# Patient Record
Sex: FEMALE | Race: WHITE | NOT HISPANIC OR LATINO | Employment: STUDENT | URBAN - METROPOLITAN AREA
[De-identification: names, ages, dates, MRNs, and addresses within clinical notes are randomized per-mention and may not be internally consistent; named-entity substitution may affect disease eponyms.]

---
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
## 2020-02-07 ENCOUNTER — LAB VISIT (OUTPATIENT)
Dept: LAB | Facility: HOSPITAL | Age: 20
End: 2020-02-07
Attending: PSYCHIATRY & NEUROLOGY
Payer: COMMERCIAL

## 2020-02-07 ENCOUNTER — OFFICE VISIT (OUTPATIENT)
Dept: PEDIATRIC NEUROLOGY | Facility: CLINIC | Age: 20
End: 2020-02-07
Payer: COMMERCIAL

## 2020-02-07 VITALS
WEIGHT: 148.13 LBS | HEIGHT: 66 IN | HEART RATE: 112 BPM | SYSTOLIC BLOOD PRESSURE: 131 MMHG | DIASTOLIC BLOOD PRESSURE: 72 MMHG | BODY MASS INDEX: 23.81 KG/M2

## 2020-02-07 DIAGNOSIS — G40.319 GENERALIZED EPILEPSY, INTRACTABLE: Primary | ICD-10-CM

## 2020-02-07 DIAGNOSIS — G40.009 PARTIAL IDIOPATHIC EPILEPSY: ICD-10-CM

## 2020-02-07 DIAGNOSIS — G40.209 PARTIAL EPILEPSY WITH IMPAIRMENT OF CONSCIOUSNESS: ICD-10-CM

## 2020-02-07 DIAGNOSIS — Z96.89 S/P PLACEMENT OF VNS (VAGUS NERVE STIMULATION) DEVICE: ICD-10-CM

## 2020-02-07 DIAGNOSIS — G40.319 GENERALIZED EPILEPSY, INTRACTABLE: ICD-10-CM

## 2020-02-07 LAB
ALBUMIN SERPL BCP-MCNC: 4.9 G/DL (ref 3.5–5.2)
ALP SERPL-CCNC: 106 U/L (ref 55–135)
ALT SERPL W/O P-5'-P-CCNC: 22 U/L (ref 10–44)
ANION GAP SERPL CALC-SCNC: 13 MMOL/L (ref 8–16)
AST SERPL-CCNC: 16 U/L (ref 10–40)
BASOPHILS # BLD AUTO: 0.04 K/UL (ref 0–0.2)
BASOPHILS NFR BLD: 0.4 % (ref 0–1.9)
BILIRUB SERPL-MCNC: 0.4 MG/DL (ref 0.1–1)
BUN SERPL-MCNC: 8 MG/DL (ref 6–20)
CALCIUM SERPL-MCNC: 10.2 MG/DL (ref 8.7–10.5)
CHLORIDE SERPL-SCNC: 106 MMOL/L (ref 95–110)
CO2 SERPL-SCNC: 24 MMOL/L (ref 23–29)
CREAT SERPL-MCNC: 0.9 MG/DL (ref 0.5–1.4)
DIFFERENTIAL METHOD: NORMAL
EOSINOPHIL # BLD AUTO: 0.2 K/UL (ref 0–0.5)
EOSINOPHIL NFR BLD: 1.5 % (ref 0–8)
ERYTHROCYTE [DISTWIDTH] IN BLOOD BY AUTOMATED COUNT: 12.1 % (ref 11.5–14.5)
EST. GFR  (AFRICAN AMERICAN): >60 ML/MIN/1.73 M^2
EST. GFR  (NON AFRICAN AMERICAN): >60 ML/MIN/1.73 M^2
GLUCOSE SERPL-MCNC: 92 MG/DL (ref 70–110)
HCT VFR BLD AUTO: 45 % (ref 37–48.5)
HGB BLD-MCNC: 15.7 G/DL (ref 12–16)
LYMPHOCYTES # BLD AUTO: 2.7 K/UL (ref 1–4.8)
LYMPHOCYTES NFR BLD: 26 % (ref 18–48)
MCH RBC QN AUTO: 29.3 PG (ref 27–31)
MCHC RBC AUTO-ENTMCNC: 34.9 G/DL (ref 32–36)
MCV RBC AUTO: 84 FL (ref 82–98)
MONOCYTES # BLD AUTO: 0.7 K/UL (ref 0.3–1)
MONOCYTES NFR BLD: 7 % (ref 4–15)
NEUTROPHILS # BLD AUTO: 6.7 K/UL (ref 1.8–7.7)
NEUTROPHILS NFR BLD: 65.1 % (ref 38–73)
PLATELET # BLD AUTO: 349 K/UL (ref 150–350)
PMV BLD AUTO: 9.3 FL (ref 9.2–12.9)
POTASSIUM SERPL-SCNC: 4.4 MMOL/L (ref 3.5–5.1)
PROT SERPL-MCNC: 7.9 G/DL (ref 6–8.4)
RBC # BLD AUTO: 5.36 M/UL (ref 4–5.4)
SODIUM SERPL-SCNC: 143 MMOL/L (ref 136–145)
TSH SERPL DL<=0.005 MIU/L-ACNC: 2.53 UIU/ML (ref 0.4–4)
WBC # BLD AUTO: 10.2 K/UL (ref 3.9–12.7)

## 2020-02-07 PROCEDURE — 36415 COLL VENOUS BLD VENIPUNCTURE: CPT

## 2020-02-07 PROCEDURE — 80053 COMPREHEN METABOLIC PANEL: CPT

## 2020-02-07 PROCEDURE — 95977 ALYS CPLX CN NPGT PRGRMG: CPT | Mod: S$GLB,,, | Performed by: PSYCHIATRY & NEUROLOGY

## 2020-02-07 PROCEDURE — 95977 PR ELEC ANALYSIS, IMPLT NEURO PULSE GEN, W/PRGRM, CMPLX CRAN NERVE: ICD-10-PCS | Mod: S$GLB,,, | Performed by: PSYCHIATRY & NEUROLOGY

## 2020-02-07 PROCEDURE — 80177 DRUG SCRN QUAN LEVETIRACETAM: CPT

## 2020-02-07 PROCEDURE — 3008F PR BODY MASS INDEX (BMI) DOCUMENTED: ICD-10-PCS | Mod: CPTII,S$GLB,, | Performed by: PSYCHIATRY & NEUROLOGY

## 2020-02-07 PROCEDURE — 99215 OFFICE O/P EST HI 40 MIN: CPT | Mod: 25,S$GLB,, | Performed by: PSYCHIATRY & NEUROLOGY

## 2020-02-07 PROCEDURE — 80175 DRUG SCREEN QUAN LAMOTRIGINE: CPT

## 2020-02-07 PROCEDURE — 99215 PR OFFICE/OUTPT VISIT, EST, LEVL V, 40-54 MIN: ICD-10-PCS | Mod: 25,S$GLB,, | Performed by: PSYCHIATRY & NEUROLOGY

## 2020-02-07 PROCEDURE — 99999 PR PBB SHADOW E&M-EST. PATIENT-LVL III: CPT | Mod: PBBFAC,,, | Performed by: PSYCHIATRY & NEUROLOGY

## 2020-02-07 PROCEDURE — 85025 COMPLETE CBC W/AUTO DIFF WBC: CPT

## 2020-02-07 PROCEDURE — 99999 PR PBB SHADOW E&M-EST. PATIENT-LVL III: ICD-10-PCS | Mod: PBBFAC,,, | Performed by: PSYCHIATRY & NEUROLOGY

## 2020-02-07 PROCEDURE — 84443 ASSAY THYROID STIM HORMONE: CPT

## 2020-02-07 PROCEDURE — 3008F BODY MASS INDEX DOCD: CPT | Mod: CPTII,S$GLB,, | Performed by: PSYCHIATRY & NEUROLOGY

## 2020-02-07 RX ORDER — LEVETIRACETAM 1000 MG/1
1500 TABLET ORAL 2 TIMES DAILY
Qty: 90 TABLET | Refills: 4 | Status: SHIPPED | OUTPATIENT
Start: 2020-02-07 | End: 2020-07-09

## 2020-02-07 RX ORDER — LAMOTRIGINE 200 MG/1
200 TABLET ORAL 2 TIMES DAILY
Qty: 60 TABLET | Refills: 6 | Status: SHIPPED | OUTPATIENT
Start: 2020-02-07 | End: 2020-08-27 | Stop reason: SDUPTHER

## 2020-02-07 NOTE — PROGRESS NOTES
Subjective:      Patient ID: Diana Trivedi is a 19 y.o. female with focal intractable generalized epilepsy     HPI     She was last seen on 6/10/19.    She is well. No GTC seizures since 2017. She still has eye fluttering a few times a day but is conscious during the episodes, they do not cause functional impairment. She is practicing driving with her step dad and does not have these spells She is compliant with her meds, no missed doses. She is taking Lamictal 200 mg BID and Keppra 1500mg BID. She feels tired but denies fainting, dizziness, syncope. She has occasional headache but denies blurry vision, nausea, vomiting. No fever, weight loss, night sweats. Denies low mood and crying spells        Failed meds- zonegran    VNS  Output Current mA 1 to 1.25 to 1.5  Signal Freq Hz 30   Pulse width uSec 250  Signal on time Sec 30  Signal off time Min 5.0  Mag Current mA 1.25 to 1.5 to 1.75  Mag on time Sec 60  Pulse width uSec 500  Near EOS No   autostim 1.125 to 1.375 to 1.625  sens 30%        MRI 12/27/13 normal  ASHLEY nml  LTM 3/2014- multiple focal seizures right ant parasaggital also right posterior quadrant     2 Hour EEG 2/16/18- normal    Labs 6/10/19-  lamictal level 7.6  CMP ok  CBC ok  TSH OK       Received long term EMU results off medications from [facility].  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 Taking online college courses.      The following portions of the patient's history were reviewed and updated as appropriate: allergies, current medications, past family history, past medical history, past social history, past surgical history and problem list.     Review of Systems   Constitutional: Positive for fatigue,  HENT: Negative.    Neurological: Positive for seizures. Negative for dizziness, tremors, syncope, facial asymmetry, weakness.   Hematological: Negative.    Psychiatric/Behavioral: Negative for agitation,  self-injury and sleep disturbance. The patient is not nervous/anxious.    All other systems reviewed and are negative.        Objective:   Neurologic Exam      Mental Status   Oriented to person, place, and time.      Cranial Nerves      CN III, IV, VI   Pupils are equal, round, and reactive to light.  Extraocular motions are normal.      Motor Exam      Strength   Strength 5/5 throughout.         Physical Exam   Constitutional: She is oriented to person, place, and time. She appears well-developed.   HENT:   Head: Normocephalic.   Eyes: EOM are normal. Pupils are equal, round, and reactive to light.   Neck: Normal range of motion.   Cardiovascular: Normal rate and regular rhythm.   Pulmonary/Chest: Effort normal and breath sounds normal.   Abdominal: Soft.   Musculoskeletal: Normal range of motion.   Neurological: She is alert and oriented to person, place, and time. She has normal strength and normal reflexes. No cranial nerve deficit or sensory deficit. She exhibits normal muscle tone. Coordination and gait normal.   Fundoscopic exam normal with no papilledema     Skin: Skin is warm. No rash noted.           Assessment:   20 yo with intractable generalized epilepsy well controlled on current dose of lamictal and keppra.      Plan:     -Continue Lamictal  200 mg BID and Keppra 1500 mg BID  - Family did not want to get EMU because they are sure that eye rolling episodes are seizures  Labs and levels reviewed  Seizure precautions and seizure first aid were discussed with the patient and family.  Discussed transition to adult epilepsy  VNS adjusted as above  Family was instructed to contact either the primary care physician office or our office by telephone if there is any deterioration in his neurologic status, change in presenting symptoms, lack of beneficial response to treatment plan, or signs of adverse effects of current therapies, all of which were reviewed.

## 2020-02-10 LAB
LAMOTRIGINE SERPL-MCNC: 9.9 UG/ML (ref 2–15)
LEVETIRACETAM SERPL-MCNC: 52.6 UG/ML (ref 3–60)

## 2020-08-05 ENCOUNTER — TELEPHONE (OUTPATIENT)
Dept: PEDIATRIC NEUROLOGY | Facility: CLINIC | Age: 20
End: 2020-08-05

## 2020-08-05 ENCOUNTER — TELEPHONE (OUTPATIENT)
Dept: NEUROLOGY | Facility: CLINIC | Age: 20
End: 2020-08-05

## 2020-08-05 NOTE — TELEPHONE ENCOUNTER
Contacted patient/parent. Advised that MD discussed transition to adult neurology at last appt and is encouraging patient to establish care with adult neurology. Parent/patient verbalized understanding.     ----- Message from Kimmie Handy MD sent at 8/5/2020 10:23 AM CDT -----  Contact: Pt [phone]--  We discussed transition at last appt. Please encourage adult.  If they insist, can see one more time.  Will need to be in person because VNS  ----- Message -----  From: Hannah Baldwin  Sent: 8/4/2020  11:37 AM CDT  To: Rozina Burks Staff    Needs Advice    Reason for call:--Appointment--        Communication Preference:--Diana--[phone]--    Additional Information:Pt calling to schedule an f/u appointment with Dr Handy. I tried to schedule but would not allow me to due to she an adult pt. Please call to scheduling.

## 2020-08-05 NOTE — TELEPHONE ENCOUNTER
----- Message from Whit Ross sent at 8/5/2020  2:54 PM CDT -----  Regarding: appointment  Contact: Todd (mother) @ [phone]  Calling to schedule an appt with a Neurologist for seizures, asking to be seen in August. Please call.

## 2020-08-05 NOTE — TELEPHONE ENCOUNTER
Returned call to parent, spoke to mother. Advised per MD patient needs to transition to adult neurology. Mother verbalized understanding.     ----- Message from Radha Cabrera sent at 8/5/2020  2:57 PM CDT -----  Contact: Mom [phone]  Would like to receive medical advice.    Would they like a call back or a response via [facility]:  Call back     Additional information:  Calling to speak to the nurse regarding the pt being seen. Mom states the pt was told she would have to see an adult provider.

## 2020-08-05 NOTE — TELEPHONE ENCOUNTER
Left message offering appointment with Dr Bender at [facility] [place] location on August 27

## 2020-08-27 ENCOUNTER — OFFICE VISIT (OUTPATIENT)
Dept: NEUROLOGY | Facility: CLINIC | Age: 20
End: 2020-08-27
Payer: COMMERCIAL

## 2020-08-27 ENCOUNTER — LAB VISIT (OUTPATIENT)
Dept: LAB | Facility: OTHER | Age: 20
End: 2020-08-27
Attending: PSYCHIATRY & NEUROLOGY
Payer: COMMERCIAL

## 2020-08-27 VITALS
BODY MASS INDEX: 26.12 KG/M2 | SYSTOLIC BLOOD PRESSURE: 111 MMHG | HEIGHT: 66 IN | HEART RATE: 76 BPM | DIASTOLIC BLOOD PRESSURE: 72 MMHG | WEIGHT: 162.5 LBS

## 2020-08-27 DIAGNOSIS — G40.009 PARTIAL IDIOPATHIC EPILEPSY: ICD-10-CM

## 2020-08-27 DIAGNOSIS — G40.909 NONINTRACTABLE EPILEPSY WITHOUT STATUS EPILEPTICUS, UNSPECIFIED EPILEPSY TYPE: Primary | ICD-10-CM

## 2020-08-27 DIAGNOSIS — Z96.89 S/P PLACEMENT OF VNS (VAGUS NERVE STIMULATION) DEVICE: ICD-10-CM

## 2020-08-27 PROBLEM — G40.319 GENERALIZED EPILEPSY, INTRACTABLE: Status: RESOLVED | Noted: 2018-05-17 | Resolved: 2020-08-27

## 2020-08-27 PROCEDURE — 3008F PR BODY MASS INDEX (BMI) DOCUMENTED: ICD-10-PCS | Mod: CPTII,S$GLB,, | Performed by: PSYCHIATRY & NEUROLOGY

## 2020-08-27 PROCEDURE — 3008F BODY MASS INDEX DOCD: CPT | Mod: CPTII,S$GLB,, | Performed by: PSYCHIATRY & NEUROLOGY

## 2020-08-27 PROCEDURE — 36415 COLL VENOUS BLD VENIPUNCTURE: CPT

## 2020-08-27 PROCEDURE — 99205 OFFICE O/P NEW HI 60 MIN: CPT | Mod: S$GLB,,, | Performed by: PSYCHIATRY & NEUROLOGY

## 2020-08-27 PROCEDURE — 99205 PR OFFICE/OUTPT VISIT, NEW, LEVL V, 60-74 MIN: ICD-10-PCS | Mod: S$GLB,,, | Performed by: PSYCHIATRY & NEUROLOGY

## 2020-08-27 PROCEDURE — 80175 DRUG SCREEN QUAN LAMOTRIGINE: CPT

## 2020-08-27 PROCEDURE — 80177 DRUG SCRN QUAN LEVETIRACETAM: CPT

## 2020-08-27 PROCEDURE — 99999 PR PBB SHADOW E&M-EST. PATIENT-LVL III: CPT | Mod: PBBFAC,,, | Performed by: PSYCHIATRY & NEUROLOGY

## 2020-08-27 PROCEDURE — 99999 PR PBB SHADOW E&M-EST. PATIENT-LVL III: ICD-10-PCS | Mod: PBBFAC,,, | Performed by: PSYCHIATRY & NEUROLOGY

## 2020-08-27 PROCEDURE — 95976 PR ELEC ANALYSIS, IMPLT NEURO PULSE GEN, W/PRGRM, SMPL CRAN NERVE: ICD-10-PCS | Mod: S$GLB,,, | Performed by: PSYCHIATRY & NEUROLOGY

## 2020-08-27 PROCEDURE — 95976 ALYS SMPL CN NPGT PRGRMG: CPT | Mod: S$GLB,,, | Performed by: PSYCHIATRY & NEUROLOGY

## 2020-08-27 RX ORDER — LEVETIRACETAM 1000 MG/1
1500 TABLET ORAL 2 TIMES DAILY
Qty: 270 TABLET | Refills: 3 | Status: SHIPPED | OUTPATIENT
Start: 2020-08-27 | End: 2021-08-11 | Stop reason: SDUPTHER

## 2020-08-27 RX ORDER — LAMOTRIGINE 200 MG/1
200 TABLET ORAL 2 TIMES DAILY
Qty: 180 TABLET | Refills: 3 | Status: SHIPPED | OUTPATIENT
Start: 2020-08-27 | End: 2021-07-26

## 2020-08-27 RX ORDER — FOLIC ACID 1 MG/1
1 TABLET ORAL DAILY
Qty: 90 TABLET | Refills: 3 | Status: SHIPPED | OUTPATIENT
Start: 2020-08-27 | End: 2021-08-11 | Stop reason: SDUPTHER

## 2020-08-27 NOTE — LETTER
August 27, 2020      Kimmie Handy MD  [address]  [facility] [address]           [facility] [address]  [place] [address]  Phone: [phone]  Fax: [phone]          Patient: Diana Trivedi   MR Number: [id number]   YOB: 2000   Date of Visit: 8/27/2020       Dear Dr. Kimmie Handy:    Thank you for referring Diana Trivedi to me for evaluation. Attached you will find relevant portions of my assessment and plan of care.    If you have questions, please do not hesitate to call me. I look forward to following Diana Trivedi along with you.    Sincerely,    Bettye Bender MD PhD    Enclosure  CC:  No Recipients    If you would like to receive this communication electronically, please contact [email or web address] or [phone] to request more information on [employer] access.    For providers and/or their staff who would like to refer a patient to [facility], please contact us through our one-stop-shop provider referral line, [facility], at [phone].    If you feel you have received this communication in error or would no longer like to receive these types of communications, please e-mail [email or web address]

## 2020-08-27 NOTE — PATIENT INSTRUCTIONS
You came to neurology clinic because of your seizure disorder. You have not had any seizures since 2017. Let's keep your medications the same. Keppra 1500mg twice daily and Lamictal 200mg twice daily. Please go by the lab and get the levels drawn on your way out.  Please start folic acid 1 mg daily.    Go to the AES (american epilepsy society) website for many patient resources for people with epilepsy.      Confirm with your dermatologist that he does not think your rash could be a drug rash. If this is possible, we can try stopping the lamotrigine to see if that would help.  Come back to clinic if that is something you decide you would like to try. If you would like to use something to avoid pregnancy, consider an IUD or progesterone only formulation.     Please try to get regular exercise. Try 3-4 days a week, for 30 minutes at a time.     Per [place] law, no episodes of loss of consciousness for 6 months before you may drive.  Please avoid dangerous situations.  For example, no baths/pools by yourself, no heights, no power tools.  Please wear a bike helmet.  If you have a single breakthrough seizure, please call the office and we will decide the next steps. If you have multiple seizures in a row and do not return back to baseline, 911 needs to be called.     Return to clinic in a year, sooner with any issues. Also, please patient portal with any concerns.     Bettye Bender MD PhD  Neurology-Epilepsy  [facility].  [facility]

## 2020-08-27 NOTE — PROGRESS NOTES
Name: Diana Trivedi  MRN:[id number]   CSN: [id number]  Date of service: 8/27/2020  Age:20 y.o.   Gender:female   Referring Physician/Service: Kimmie Handy MD  [address] [place] [address]   The patient is here today with:  mom    Neurology Clinic: Initial Visit    CHIEF COMPLAINT:      HPI 8/27/2020:     Age of first seizure: 9yo   Seizure Risk Factors: maternal 2nd cousin once removed and 3rd cousin once removed with seizures as a kid but controlled now, No head strike with LOC, no CNS infection, term (2weeks over due) no prolonged hospitalization    Time of Last Seizure: 2017 EMU stay with 3 seizures in 1 day, ripped toenail off. Head strike.   # of lifetime Seizures:  10 big stiffening episodes, hand rubbing face maybe more than 500  Frequency of Seizures: None in three years, before that every 5 minutes with the rubbing events   Seizure Triggers: missed medication, stress, poor sleep, seizures usually in the morning.   Injuries/Hospitalization for seizures?  Falls, bloody nose, head strike, ripped off toenail. 3 hospitalizations. 2 EMU stays.   Pregnancy? Maybe one day   Contraception? None, just quit because of weight gain   Folic Acid? Not currently on   Bone Health: no dexa     Auras: stomach will twirl, fingers curl, seconds before the seizure     Seizure Events:   1. Roaming around, fusses, eyes big, hands go up towards chest, whole body stiffening, chewing, makes an ictal grunt, no urinary incontinence, tongue bite   2. Eyes rolling up, will not hear anything, stuttering, left hand rubbing face (stared in pre-K, not since starting keppra)    Current AED/SEs:  1. Keppra 1500mg twice daily SE: none   2. Lamotrigine 200mg BID SE: none     Previous AED/SEs or reason for DC.   1.  Zonisamide SE didn't work     VNS placed 7/2018  Output Current mA 1.5  Signal Freq Hz 20   Pulse width uSec 250  Signal on time Sec 30  Signal off time Min 5.0  Mag Current mA 1.75  Mag on time Sec  "60  Pulse width uSec 500  Near EOS No   autostim 1.625  sens 30%   Battery %    EEG:   Eeg 01/2014 (result in media tab)- posterior dominant sharply contoured bursts noted intermittently, 1 to several seconds long epileptiform bursts of polyspike activity maximal over frontal head regions both generalized and more fragmented with shifting maxima, these findings are most consistent with a tendency to primary generalized seizures.  The presence of fragmented shifting bursts raises the question of a lateralize deep frontal focus of secondary generalization.  LTM 3/2014- multiple focal seizures right ant parasaggital also right posterior quadrant  2 Hour EEG 2/16/18- normal  Received long term EMU results off medications from [facility].  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 Migrating headaches. 2HA/per month, do not require treatment. Constipation. Otherwise, denies frequent eye blinking, loss of vision, blurred vision, diplopia, dysarthria, dysphagia, lightheadedness, vertigo, tinnitus or hearing difficulty. Denies difficulties producing or comprehending speech.  Denies focal weakness, numbness, paresthesias. Denies difficulty with gait. Denies cough, shortness of breath.  Denies chest pain or tightness, palpitations.  Denies nausea, vomiting, diarrhea, or abdominal pain.  No bowel or bladder incontinence or retention.  No saddle anesthesia.  No falls.       EXAM:   - Vitals: /72   Pulse 76   Ht 5' 5.75" (1.67 m)   Wt 73.7 kg (162 lb 7.7 oz)   BMI 26.42 kg/m²    - General: Awake, cooperative, NAD.  - HEENT: NC/AT  - Neck: Supple  - Pulmonary: no increased WOB  - Cardiac: well perfused "   - Abdomen: soft, nontender, nondistended  - Extremities: no edema  - Skin:  Small papules over the face, abdomen, back.  Larger plaques over the chest and in between the breasts.  No skin breaks.      NEURO EXAM:   - Mental Status: Awake, alert, oriented x 3. Able to relate history without difficulty. Attentive to examiner. Language is fluent with intact repetition and comprehension. Normal prosody. There were no paraphasic errors. Able to name both high and low frequency objects. Speech was not dysarthric. Able to follow both midline and appendicular commands. Able to register 3 objects and recall 3/3 at 5 minutes. There was no evidence of apraxia or neglect.    - Cranial Nerves:  VFF to confrontation. EOMI without nystagmus. Normal saccades. Facial sensation intact to light touch. No facial droop. Hearing intact to room voice. 5/5 strength in trapezii and SCM bilaterally. Tongue protrudes in midline and to either side with no evidence of atrophy or weakness.    - Motor: Normal bulk and tone throughout. No pronator drift bilaterally. No adventitious movements such as tremor or asterixis noted.     Delt Bic Tri WrE WrF  FFl FE IO IP Quad Ham TA Gastroc   R   5     5    5    5    5        5   5    5   5    5        5     5      5          L   5      5    5   5    5        5    5   5    5    5       5     5      5            - Sensory: No deficits to light touch. No extinction to DSS.  - DTRs:    Bi Tri Brach Pat Ach  R  2  2    2    2   1  L  2  2    2    2   1  - Coordination: No dysmetria on FNF  - Gait: Good initiation. Narrow-based, normal stride and arm swing. Able to toe/heel/walk in tandem without difficulty. Romberg negative.    PLAN:  20-year-old woman with epilepsy comes to clinic to establish care.  No EEG tracing abnormalities to review however report of both generalized appearing discharges with eyelid myoclonia and focal characteristics (focal discharges and seizures) making definitive  characterization unknown.  However, semiology seems most consistent with focal epilepsy (chewing, repetitive hand rubbing).  Reports a rash lasting approximately a year over chest, back, face, not in mucous membranes.  Showed up years after starting lamotrigine but indolent drug rash still a consideration.  Going to check with Dermatology for their opinion.  Lamotrigine 200 mg twice daily.  Levetiracetam 1500 mg twice daily.  Folic acid 1 mg daily. Drug levels today.  May need to wean lamotrigine depending on rash.  Follow up in about 1 year (around 8/27/2021).     Patient Instructions   You came to neurology clinic because of your seizure disorder. You have not had any seizures since 2017. Let's keep your medications the same. Keppra 1500mg twice daily and Lamictal 200mg twice daily. Please go by the lab and get the levels drawn on your way out.  Please start folic acid 1 mg daily.    Go to the AES (american epilepsy society) website for many patient resources for people with epilepsy.      Confirm with your dermatologist that he does not think your rash could be a drug rash. If this is possible, we can try stopping the lamotrigine to see if that would help.  Come back to clinic if that is something you decide you would like to try. If you would like to use something to avoid pregnancy, consider an IUD or progesterone only formulation.     Please try to get regular exercise. Try 3-4 days a week, for 30 minutes at a time.     Per [place] law, no episodes of loss of consciousness for 6 months before you may drive.  Please avoid dangerous situations.  For example, no baths/pools by yourself, no heights, no power tools.  Please wear a bike helmet.  If you have a single breakthrough seizure, please call the office and we will decide the next steps. If you have multiple seizures in a row and do not return back to baseline, 911 needs to be called.     Return to clinic in a year, sooner with any issues. Also, please  patient portal with any concerns.     Bettye Bender MD PhD  Neurology-Epilepsy  [facility].  [facility]         Problem List Items Addressed This Visit     Nonintractable epilepsy without status epilepticus - Primary    Relevant Medications    folic acid (FOLVITE) 1 MG tablet    levETIRAcetam (KEPPRA) 1000 MG tablet    lamoTRIgine (LAMICTAL) 200 MG tablet    Other Relevant Orders    Levetiracetam level    Lamotrigine level    S/P placement of VNS (vagus nerve stimulation) device          More than 50% of the 60 minutes spent with the patient (as well as family/caregiver(s) was spent on face-to-face counseling.    Disclaimer: This note has been generated using voice-recognition software. There may be typographical errors that were missed during proof-reading.     LABS:  Recent Labs   Lab 06/10/19  1540 02/07/20  1153   WBC 6.64 10.20   Hemoglobin 14.8 15.7   Hematocrit 42.2 45.0   Platelets 337 349   Sodium 139 143   Potassium 4.1 4.4   BUN, Bld 12 8   Creatinine 0.9 0.9   eGFR if African American >60.0 >60.0   eGFR if non African American >60.0 >60.0   TSH 3.594 2.533     Recent Labs   Lab 09/10/18  1604 06/10/19  1540 02/07/20  1153   Levetiracetam Lvl 26.7  --  52.6   Lamotrigine Lvl 8.2 7.6 9.9        IMAGING:  Recent imaging is personally reviewed with the patient.    Eeg 01/2014 (result in media tab)- posterior dominant sharply contoured bursts noted intermittently, 1 to several seconds long epileptiform bursts of polyspike activity maximal over frontal head regions both generalized and more fragmented with shifting maxima, these findings are most consistent with a tendency to primary generalized seizures.  The presence of fragmented shifting bursts raises the question of a lateralize deep frontal focus of secondary generalization.  LTM 3/2014- multiple focal seizures right ant parasaggital also right posterior quadrant  2 Hour EEG 2/16/18- normal  Received long term EMU results off  medications from [facility].  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 consitent with Michelle nascimento     MRI 12/27/13 normal    PAST MEDICAL HISTORY:   Active Ambulatory Problems     Diagnosis Date Noted    Nonintractable epilepsy without status epilepticus 07/09/2018    S/P placement of VNS (vagus nerve stimulation) device 08/14/2018     Resolved Ambulatory Problems     Diagnosis Date Noted    Partial epilepsy with impairment of consciousness 03/06/2015    Generalized epilepsy, intractable 05/17/2018     Past Medical History:   Diagnosis Date    Developmental delay     Seizures         PAST SURGICAL HISTORY:   Past Surgical History:   Procedure Laterality Date    VAGUS NERVE STIMULATOR INSERTION N/A 7/9/2018    Procedure: INSERTION, NEUROSTIMULATOR, VAGAL;  Surgeon: Mat Bernardo MD;  Location: [facility] OR [address] [place] [address];  Service: Neurosurgery;  Laterality: N/A;  toronto II, ASA 2, regular bed, supine, [place]        ALLERGIES: Patient has no known allergies.   CURRENT MEDICATIONS:   Current Outpatient Medications   Medication Sig Dispense Refill    lamoTRIgine (LAMICTAL) 200 MG tablet Take 1 tablet (200 mg total) by mouth 2 (two) times daily. 180 tablet 3    levETIRAcetam (KEPPRA) 1000 MG tablet Take 1.5 tablets (1,500 mg total) by mouth 2 (two) times daily. 270 tablet 3    folic acid (FOLVITE) 1 MG tablet Take 1 tablet (1 mg total) by mouth once daily. 90 tablet 3    medroxyprogesterone (DEPO-SUBQ PROVERA) 104 mg/0.65 mL injection Inject 104 mg into the skin every 3 (three) months.       No current facility-administered medications for this visit.         FAMILY HISTORY:   Family History   Problem Relation Age of Onset    Cancer Maternal Grandmother     Migraines Mother          SOCIAL HISTORY:   Social History     Socioeconomic History    Marital status: Single     Spouse name: Not on file    Number of children: Not on file    Years of education: Not on file    Highest education  level: Not on file   Occupational History    Not on file   Social Needs    Financial resource strain: Not on file    Food insecurity     Worry: Not on file     Inability: Not on file    Transportation needs     Medical: Not on file     Non-medical: Not on file   Tobacco Use    Smoking status: Never Smoker    Smokeless tobacco: Never Used   Substance and Sexual Activity    Alcohol use: No    Drug use: No    Sexual activity: Not Currently   Lifestyle    Physical activity     Days per week: Not on file     Minutes per session: Not on file    Stress: Not on file   Relationships    Social connections     Talks on phone: Not on file     Gets together: Not on file     Attends Alevism service: Not on file     Active member of club or organization: Not on file     Attends meetings of clubs or organizations: Not on file     Relationship status: Not on file   Other Topics Concern    Not on file   Social History Narrative    8th grade. Struggles in school         Questions and concerns raised by the patient and family/care-giver(s) were addressed and they indicated understanding of everything discussed and agreed to plans as above.    Bettye Bender MD PhD  Neurology-Epilepsy  [facility].  [facility]

## 2020-08-31 LAB
LAMOTRIGINE SERPL-MCNC: 6.9 UG/ML (ref 2–15)
LEVETIRACETAM SERPL-MCNC: 25.8 UG/ML (ref 3–60)

## 2020-11-01 ENCOUNTER — PATIENT MESSAGE (OUTPATIENT)
Dept: NEUROLOGY | Facility: CLINIC | Age: 20
End: 2020-11-01

## 2021-02-03 ENCOUNTER — PATIENT MESSAGE (OUTPATIENT)
Dept: NEUROLOGY | Facility: CLINIC | Age: 21
End: 2021-02-03

## 2021-04-26 ENCOUNTER — PATIENT MESSAGE (OUTPATIENT)
Dept: RESEARCH | Facility: HOSPITAL | Age: 21
End: 2021-04-26

## 2021-05-23 ENCOUNTER — PATIENT MESSAGE (OUTPATIENT)
Dept: NEUROLOGY | Facility: CLINIC | Age: 21
End: 2021-05-23

## 2021-05-24 ENCOUNTER — PATIENT MESSAGE (OUTPATIENT)
Dept: NEUROLOGY | Facility: CLINIC | Age: 21
End: 2021-05-24

## 2021-07-25 DIAGNOSIS — G40.909 NONINTRACTABLE EPILEPSY WITHOUT STATUS EPILEPTICUS, UNSPECIFIED EPILEPSY TYPE: Primary | ICD-10-CM

## 2021-07-26 RX ORDER — LAMOTRIGINE 200 MG/1
200 TABLET ORAL 2 TIMES DAILY
Qty: 180 TABLET | Refills: 3 | Status: SHIPPED | OUTPATIENT
Start: 2021-07-26 | End: 2022-05-22

## 2021-07-28 ENCOUNTER — PATIENT MESSAGE (OUTPATIENT)
Dept: NEUROLOGY | Facility: CLINIC | Age: 21
End: 2021-07-28

## 2021-07-28 ENCOUNTER — TELEPHONE (OUTPATIENT)
Dept: NEUROLOGY | Facility: CLINIC | Age: 21
End: 2021-07-28

## 2021-08-09 ENCOUNTER — PATIENT MESSAGE (OUTPATIENT)
Dept: NEUROLOGY | Facility: CLINIC | Age: 21
End: 2021-08-09

## 2021-08-09 ENCOUNTER — TELEPHONE (OUTPATIENT)
Dept: NEUROLOGY | Facility: CLINIC | Age: 21
End: 2021-08-09

## 2021-08-11 ENCOUNTER — OFFICE VISIT (OUTPATIENT)
Dept: NEUROLOGY | Facility: CLINIC | Age: 21
End: 2021-08-11
Payer: COMMERCIAL

## 2021-08-11 DIAGNOSIS — L30.9 ECZEMA, UNSPECIFIED TYPE: ICD-10-CM

## 2021-08-11 DIAGNOSIS — Z96.89 S/P PLACEMENT OF VNS (VAGUS NERVE STIMULATION) DEVICE: ICD-10-CM

## 2021-08-11 DIAGNOSIS — G40.909 NONINTRACTABLE EPILEPSY WITHOUT STATUS EPILEPTICUS, UNSPECIFIED EPILEPSY TYPE: Primary | ICD-10-CM

## 2021-08-11 PROCEDURE — 1160F RVW MEDS BY RX/DR IN RCRD: CPT | Mod: CPTII,,, | Performed by: PSYCHIATRY & NEUROLOGY

## 2021-08-11 PROCEDURE — 1159F MED LIST DOCD IN RCRD: CPT | Mod: CPTII,,, | Performed by: PSYCHIATRY & NEUROLOGY

## 2021-08-11 PROCEDURE — 99213 OFFICE O/P EST LOW 20 MIN: CPT | Mod: 95,,, | Performed by: PSYCHIATRY & NEUROLOGY

## 2021-08-11 PROCEDURE — 1159F PR MEDICATION LIST DOCUMENTED IN MEDICAL RECORD: ICD-10-PCS | Mod: CPTII,,, | Performed by: PSYCHIATRY & NEUROLOGY

## 2021-08-11 PROCEDURE — 99213 PR OFFICE/OUTPT VISIT, EST, LEVL III, 20-29 MIN: ICD-10-PCS | Mod: 95,,, | Performed by: PSYCHIATRY & NEUROLOGY

## 2021-08-11 PROCEDURE — 1160F PR REVIEW ALL MEDS BY PRESCRIBER/CLIN PHARMACIST DOCUMENTED: ICD-10-PCS | Mod: CPTII,,, | Performed by: PSYCHIATRY & NEUROLOGY

## 2021-08-11 RX ORDER — CLOBETASOL PROPIONATE 0.5 MG/G
CREAM TOPICAL
COMMUNITY
Start: 2021-03-01

## 2021-08-11 RX ORDER — MEDROXYPROGESTERONE ACETATE 150 MG/ML
INJECTION, SUSPENSION INTRAMUSCULAR
COMMUNITY
Start: 2021-04-09

## 2021-08-11 RX ORDER — FOLIC ACID 1 MG/1
1 TABLET ORAL DAILY
Qty: 90 TABLET | Refills: 3 | Status: SHIPPED | OUTPATIENT
Start: 2021-08-11 | End: 2022-06-09 | Stop reason: SDUPTHER

## 2021-08-11 RX ORDER — CLOBETASOL PROPIONATE 0.05 G/100ML
1 SHAMPOO TOPICAL DAILY
COMMUNITY
Start: 2021-07-12 | End: 2022-06-09

## 2021-08-11 RX ORDER — LEVETIRACETAM 1000 MG/1
1500 TABLET ORAL 2 TIMES DAILY
Qty: 270 TABLET | Refills: 3 | Status: SHIPPED | OUTPATIENT
Start: 2021-08-11 | End: 2021-08-31 | Stop reason: SDUPTHER

## 2021-08-30 ENCOUNTER — PATIENT MESSAGE (OUTPATIENT)
Dept: NEUROLOGY | Facility: CLINIC | Age: 21
End: 2021-08-30

## 2021-08-30 DIAGNOSIS — G40.909 NONINTRACTABLE EPILEPSY WITHOUT STATUS EPILEPTICUS, UNSPECIFIED EPILEPSY TYPE: ICD-10-CM

## 2021-08-31 RX ORDER — LEVETIRACETAM 1000 MG/1
1500 TABLET ORAL 2 TIMES DAILY
Qty: 270 TABLET | Refills: 3 | Status: SHIPPED | OUTPATIENT
Start: 2021-08-31 | End: 2022-06-09 | Stop reason: SDUPTHER

## 2021-09-04 ENCOUNTER — PATIENT MESSAGE (OUTPATIENT)
Dept: NEUROLOGY | Facility: CLINIC | Age: 21
End: 2021-09-04

## 2022-04-01 ENCOUNTER — PATIENT MESSAGE (OUTPATIENT)
Dept: NEUROLOGY | Facility: CLINIC | Age: 22
End: 2022-04-01
Payer: COMMERCIAL

## 2022-04-28 ENCOUNTER — LAB VISIT (OUTPATIENT)
Dept: LAB | Facility: HOSPITAL | Age: 22
End: 2022-04-28
Attending: PSYCHIATRY & NEUROLOGY
Payer: COMMERCIAL

## 2022-04-28 DIAGNOSIS — G40.909 NONINTRACTABLE EPILEPSY WITHOUT STATUS EPILEPTICUS, UNSPECIFIED EPILEPSY TYPE: ICD-10-CM

## 2022-04-28 PROCEDURE — 80177 DRUG SCRN QUAN LEVETIRACETAM: CPT | Performed by: PSYCHIATRY & NEUROLOGY

## 2022-04-28 PROCEDURE — 80175 DRUG SCREEN QUAN LAMOTRIGINE: CPT | Performed by: PSYCHIATRY & NEUROLOGY

## 2022-04-28 PROCEDURE — 36415 COLL VENOUS BLD VENIPUNCTURE: CPT | Performed by: PSYCHIATRY & NEUROLOGY

## 2022-05-02 ENCOUNTER — PATIENT MESSAGE (OUTPATIENT)
Dept: NEUROLOGY | Facility: CLINIC | Age: 22
End: 2022-05-02
Payer: COMMERCIAL

## 2022-05-02 LAB
LAMOTRIGINE SERPL-MCNC: 6.8 UG/ML (ref 2–15)
LEVETIRACETAM SERPL-MCNC: 38.3 UG/ML (ref 3–60)

## 2022-05-21 DIAGNOSIS — G40.909 NONINTRACTABLE EPILEPSY WITHOUT STATUS EPILEPTICUS, UNSPECIFIED EPILEPSY TYPE: Primary | ICD-10-CM

## 2022-05-22 RX ORDER — LAMOTRIGINE 200 MG/1
200 TABLET ORAL 2 TIMES DAILY
Qty: 180 TABLET | Refills: 3 | Status: SHIPPED | OUTPATIENT
Start: 2022-05-22 | End: 2022-11-02 | Stop reason: SDUPTHER

## 2022-05-22 NOTE — TELEPHONE ENCOUNTER
Lamotrigine 200 mg twice daily    Bettye Bender MD PhD  Neurology-Epilepsy  [facility].

## 2022-05-25 ENCOUNTER — PATIENT MESSAGE (OUTPATIENT)
Dept: NEUROLOGY | Facility: CLINIC | Age: 22
End: 2022-05-25
Payer: COMMERCIAL

## 2022-06-09 ENCOUNTER — OFFICE VISIT (OUTPATIENT)
Dept: NEUROLOGY | Facility: CLINIC | Age: 22
End: 2022-06-09
Payer: COMMERCIAL

## 2022-06-09 VITALS
DIASTOLIC BLOOD PRESSURE: 71 MMHG | HEIGHT: 66 IN | HEART RATE: 83 BPM | BODY MASS INDEX: 28.74 KG/M2 | WEIGHT: 178.81 LBS | SYSTOLIC BLOOD PRESSURE: 112 MMHG

## 2022-06-09 DIAGNOSIS — G40.909 NONINTRACTABLE EPILEPSY WITHOUT STATUS EPILEPTICUS, UNSPECIFIED EPILEPSY TYPE: Primary | ICD-10-CM

## 2022-06-09 DIAGNOSIS — R21 PAPULAR RASH: ICD-10-CM

## 2022-06-09 PROCEDURE — 3008F PR BODY MASS INDEX (BMI) DOCUMENTED: ICD-10-PCS | Mod: CPTII,S$GLB,,

## 2022-06-09 PROCEDURE — 99215 PR OFFICE/OUTPT VISIT, EST, LEVL V, 40-54 MIN: ICD-10-PCS | Mod: S$GLB,,,

## 2022-06-09 PROCEDURE — 3078F PR MOST RECENT DIASTOLIC BLOOD PRESSURE < 80 MM HG: ICD-10-PCS | Mod: CPTII,S$GLB,,

## 2022-06-09 PROCEDURE — 99215 OFFICE O/P EST HI 40 MIN: CPT | Mod: S$GLB,,,

## 2022-06-09 PROCEDURE — 3074F PR MOST RECENT SYSTOLIC BLOOD PRESSURE < 130 MM HG: ICD-10-PCS | Mod: CPTII,S$GLB,,

## 2022-06-09 PROCEDURE — 3008F BODY MASS INDEX DOCD: CPT | Mod: CPTII,S$GLB,,

## 2022-06-09 PROCEDURE — 99999 PR PBB SHADOW E&M-EST. PATIENT-LVL III: CPT | Mod: PBBFAC,,,

## 2022-06-09 PROCEDURE — 3074F SYST BP LT 130 MM HG: CPT | Mod: CPTII,S$GLB,,

## 2022-06-09 PROCEDURE — 1159F MED LIST DOCD IN RCRD: CPT | Mod: CPTII,S$GLB,,

## 2022-06-09 PROCEDURE — 1159F PR MEDICATION LIST DOCUMENTED IN MEDICAL RECORD: ICD-10-PCS | Mod: CPTII,S$GLB,,

## 2022-06-09 PROCEDURE — 3078F DIAST BP <80 MM HG: CPT | Mod: CPTII,S$GLB,,

## 2022-06-09 PROCEDURE — 99999 PR PBB SHADOW E&M-EST. PATIENT-LVL III: ICD-10-PCS | Mod: PBBFAC,,,

## 2022-06-09 RX ORDER — FOLIC ACID 1 MG/1
1 TABLET ORAL DAILY
Qty: 90 TABLET | Refills: 3 | Status: SHIPPED | OUTPATIENT
Start: 2022-06-09 | End: 2023-06-22

## 2022-06-09 RX ORDER — APREMILAST 30 MG/1
1 TABLET, FILM COATED ORAL 2 TIMES DAILY
COMMUNITY
Start: 2022-03-29

## 2022-06-09 RX ORDER — LEVETIRACETAM 1000 MG/1
1500 TABLET ORAL 2 TIMES DAILY
Qty: 270 TABLET | Refills: 3 | Status: SHIPPED | OUTPATIENT
Start: 2022-06-09 | End: 2023-05-04 | Stop reason: SDUPTHER

## 2022-06-09 NOTE — LETTER
June 9, 2022      [facility] [address]th [address]  [address]  [facility] [address]  Phone: [phone]  Fax: [phone]       Patient: Diana Trivedi   YOB: 2000  Date of Visit: 06/09/2022    To Whom It May Concern:    Andrew Trivedi is under my care at [facility] for a neurological condition. An important part of controlling this condition is getting regular sleep. Please allow the following accommodations: no morning shifts earlier than 7am and shifts should not extend past 5pm. Thank you for your understanding. If you have any questions or concerns, or if I can be of further assistance, please do not hesitate to contact me.    Sincerely,    Elissa Jose PA-C   Neurology  [facility]

## 2022-06-09 NOTE — PROGRESS NOTES
Name: Diana Trivedi  MRN:[id number]   CSN: [id number]  Date of service: 6/9/2022  Age:22 y.o.   Gender:female   Referring Physician/Service: No referring provider defined for this encounter.   The patient is evaluated today with: soren     Neurology Clinic:  Follow-up Visit    Interval Events/ROS 6/9/2022:    Recent Labs   Lab 06/10/19  1540 02/07/20  1153 08/27/20  1630 04/28/22  1315   Levetiracetam Lvl  --  52.6 25.8 38.3   Lamotrigine Lvl 7.6 9.9 6.9 6.8      Patient presents today accompanied by soren in follow up for epilepsy. Last seizure was 11/2017. Epilepsy has been well controlled on current regimen of lamotrigine 200mg BID, levetiracetam 1500mg BID, and a VNS. No reported side effects. Taking folic acid daily. Patient has no complaints today other than a rash to back of thighs and ankles bilaterally. Pruritic. Noticed it two days ago after sitting outside for hours in a mesh chair. Scheduled to see dermatology in two weeks. Working at [email or web address], would appreciate letter requesting no morning shifts earlier than 7am as patient feels she is not able to get enough sleep if required to get to work earlier than that. Otherwise, no fever, no cold symptoms, no headache, no changes in vision, no new weakness, no chest pain, no shortness of breath, no nausea, no vomiting, no constipation, no tingling/numbness, no problems walking, no mood issues.    Interval Events/ROS 8/11/2021:    Things are going good. 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 Work is going well. No seizures.     Lamotrigine 200 mg twice daily  Levetiracetam 1500 mg twice daily  Folic acid 1 mg daily    No issues with the medications, able to get them without too much expense or trouble. VNS working well. COVID at the end of June. Headaches, right side of brain. Astigmatism. New glasses helped resolve headache. Constipated. Rash evaluated by derm -> eczema.  Much better with creams and shampoo. Otherwise, no  fever, no cold symptoms, no new weakness, no chest pain, no shortness of breath, no nausea, no vomiting, no diarrhea, no tingling/numbness, no problems walking, no mood issues.    HPI 8/27/2020:     Age of first seizure: 7yo   Seizure Risk Factors: maternal 2nd cousin once removed and 3rd cousin once removed with seizures as a kid but controlled now, No head strike with LOC, no CNS infection, term (2weeks over due) no prolonged hospitalization    Time of Last Seizure: 2017 EMU stay with 3 seizures in 1 day, ripped toenail off. Head strike.   # of lifetime Seizures:  10 big stiffening episodes, hand rubbing face maybe more than 500  Frequency of Seizures: None in three years, before that every 5 minutes with the rubbing events   Seizure Triggers: missed medication, stress, poor sleep, seizures usually in the morning.   Injuries/Hospitalization for seizures?  Falls, bloody nose, head strike, ripped off toenail. 3 hospitalizations. 2 EMU stays.   Pregnancy? Maybe one day   Contraception? None, just quit because of weight gain   Folic Acid? Not currently on   Bone Health: no dexa     Auras: stomach will twirl, fingers curl, seconds before the seizure     Seizure Events:   1. Roaming around, fusses, eyes big, hands go up towards chest, whole body stiffening, chewing, makes an ictal grunt, no urinary incontinence, tongue bite   2. Eyes rolling up, will not hear anything, stuttering, left hand rubbing face (stared in pre-K, not since starting keppra)    Current AED/SEs:  1. Keppra 1500mg twice daily SE: none   2. Lamotrigine 200mg BID SE: none     Previous AED/SEs or reason for DC.   1.  Zonisamide SE didn't work     VNS placed 7/2018  Output Current mA 1.5  Signal Freq Hz 20   Pulse width uSec 250  Signal on time Sec 30  Signal off time Min 5.0  Mag Current mA 1.75  Mag on time Sec 60  Pulse width uSec 500  Near EOS No   autostim 1.625  sens 30%   Battery %    EEG:   Eeg 01/2014 (result in media tab)- posterior  "dominant sharply contoured bursts noted intermittently, 1 to several seconds long epileptiform bursts of polyspike activity maximal over frontal head regions both generalized and more fragmented with shifting maxima, these findings are most consistent with a tendency to primary generalized seizures.  The presence of fragmented shifting bursts raises the question of a lateralize deep frontal focus of secondary generalization.  LTM 3/2014- multiple focal seizures right ant parasaggital also right posterior quadrant  2 Hour EEG 2/16/18- normal  Received long term EMU results off medications from [facility].  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 Migrating headaches. 2HA/per month, do not require treatment. Constipation. Otherwise, denies frequent eye blinking, loss of vision, blurred vision, diplopia, dysarthria, dysphagia, lightheadedness, vertigo, tinnitus or hearing difficulty. Denies difficulties producing or comprehending speech.  Denies focal weakness, numbness, paresthesias. Denies difficulty with gait. Denies cough, shortness of breath.  Denies chest pain or tightness, palpitations.  Denies nausea, vomiting, diarrhea, or abdominal pain.  No bowel or bladder incontinence or retention.  No saddle anesthesia.  No falls.       EXAM:   - Vitals: /71   Pulse 83   Ht 5' 5.75" (1.67 m)   Wt 81.1 kg (178 lb 12.7 oz)   BMI 29.08 kg/m²    - General: Awake, cooperative, NAD.  - HEENT: NC/AT  - Neck: Supple  - Pulmonary: no increased WOB  - Cardiac: well perfused   - Abdomen: soft, nontender, nondistended  - Extremities: no edema  - Skin: Erythematous papular rash noted to posterior aspect of bilateral thighs as well as to bilateral ankles.      NEURO EXAM:   - Mental Status: Awake, alert, oriented x 3. Able to relate " history without difficulty. Attentive to examiner. Language is fluent with intact repetition and comprehension. Normal prosody. There were no paraphasic errors. Able to name both high and low frequency objects. Speech was not dysarthric. Able to follow both midline and appendicular commands. Able to register 3 objects and recall 3/3 at 5 minutes. There was no evidence of apraxia or neglect.     - Cranial Nerves:  VFF to confrontation. EOMI without nystagmus. Normal saccades. Facial sensation intact to light touch. No facial droop. Hearing intact to room voice. 5/5 strength in trapezii and SCM bilaterally. Tongue protrudes in midline and to either side with no evidence of atrophy or weakness.     - Motor: Normal bulk and tone throughout. No pronator drift bilaterally. No adventitious movements such as tremor or asterixis noted.     Delt Bic Tri WrE WrF  FFl FE IO IP Quad Ham TA Gastroc   R   5     5    5    5    5        5   5    5   5    5        5     5      5          L   5      5    5   5    5        5    5   5    5    5       5     5      5             - Sensory: No deficits to light touch. No extinction to DSS.  - Coordination: No dysmetria on FNF  - Gait: Good initiation. Narrow-based, normal stride and arm swing. Able to toe/heel/walk in tandem without difficulty. Romberg negative.    PLAN:  22-year-old woman with epilepsy well controlled with VNS, levetiracetam 1500 mg twice daily, and lamotrigine 200 mg twice daily. No EEG tracing abnormalities to review however report of both generalized appearing discharges with eyelid myoclonia and focal characteristics (focal discharges and seizures) making definitive characterization unknown.  However, semiology seems most consistent with focal epilepsy (chewing, repetitive hand rubbing). VNS interrogated today with battery life at 50-75%. Levels annually. Folic acid 1 mg daily. Letter provided for work requesting no early morning shifts to allow for adequate sleep.  "Hydrocortisone cream for rash and follow up with dermatology. Follow up in about 1 year (around 6/9/2023).     Patient Instructions   You came to Epilepsy Clinic because of your seizure disorder. Your seizures are well controlled on lamotrigine 200 mg twice daily, keppra 1500 mg twice daily, and a VNS.    Do not miss any doses of your medication. If you do miss a dose, take it as soon as you remember even if that means doubling up on the dose. Take folic acid 1mg daily. Please get regular sleep. Go to sleep at the same time and wake up at the same time every day. People with epilepsy require more sleep than people without epilepsy.  Sleeping 10-12 hours a day can be normal for a person with epilepsy.      Try looking up "Good Rx" to help with cost of medications.     I recommend trying hydrocortisone cream for your rash and following up with dermatology.     Per [place] law, no episodes of loss of consciousness for 6 months before you may drive.  Please avoid dangerous situations.  For example, no baths/pools by yourself, no heights, no power tools.  Please wear a bike helmet.  If you have a single breakthrough seizure, please patient portal me or call the office and we will decide the next steps. If you have multiple seizures in a row and do not return back to baseline, 911 needs to be called.     Return to clinic in one year or sooner with issues.  Please patient portal with any questions or concerns.    Elissa Jose PA-C   Neurology-Epilepsy  [facility]       Problem List Items Addressed This Visit        Neuro    Nonintractable epilepsy without status epilepticus - Primary    Relevant Medications    levETIRAcetam (KEPPRA) 1000 MG tablet    folic acid (FOLVITE) 1 MG tablet      Other Visit Diagnoses     Papular rash            Disclaimer: This note has been generated using voice-recognition software. There may be typographical errors that were missed during proof-reading. "     LABS:  Recent Labs   Lab 06/10/19  1540 02/07/20  1153   WBC 6.64 10.20   Hemoglobin 14.8 15.7   Hematocrit 42.2 45.0   Platelets 337 349   Sodium 139 143   Potassium 4.1 4.4   BUN 12 8   Creatinine 0.9 0.9   eGFR if African American >60.0 >60.0   eGFR if non African American >60.0 >60.0   TSH 3.594 2.533       Recent Labs   Lab 06/10/19  1540 02/07/20  1153 08/27/20  1630 04/28/22  1315   Levetiracetam Lvl  --  52.6 25.8 38.3   Lamotrigine Lvl 7.6 9.9 6.9 6.8        IMAGING:  Recent imaging is personally reviewed with the patient.    Eeg 01/2014 (result in media tab)- posterior dominant sharply contoured bursts noted intermittently, 1 to several seconds long epileptiform bursts of polyspike activity maximal over frontal head regions both generalized and more fragmented with shifting maxima, these findings are most consistent with a tendency to primary generalized seizures.  The presence of fragmented shifting bursts raises the question of a lateralize deep frontal focus of secondary generalization.  LTM 3/2014- multiple focal seizures right ant parasaggital also right posterior quadrant  2 Hour EEG 2/16/18- normal  Received long term EMU results off medications from [facility].  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 consitent with Michelle nascimento     MRI 12/27/13 normal      PAST MEDICAL HISTORY:   Active Ambulatory Problems     Diagnosis Date Noted    Nonintractable epilepsy without status epilepticus 07/09/2018    S/P placement of VNS (vagus nerve stimulation) device 08/14/2018    Eczema 08/11/2021     Resolved Ambulatory Problems     Diagnosis Date Noted    Partial epilepsy with impairment of consciousness 03/06/2015    Generalized epilepsy, intractable 05/17/2018     Past Medical History:   Diagnosis Date    Developmental delay     Seizures         PAST SURGICAL HISTORY:   Past Surgical History:   Procedure Laterality Date    VAGUS NERVE STIMULATOR INSERTION N/A 7/9/2018     Procedure: INSERTION, NEUROSTIMULATOR, VAGAL;  Surgeon: Mat Bernardo MD;  Location: [facility] OR [address] [place] [address];  Service: Neurosurgery;  Laterality: N/A;  toronto II, ASA 2, regular bed, supine, [place]        ALLERGIES: Patient has no known allergies.   CURRENT MEDICATIONS:   Current Outpatient Medications   Medication Sig Dispense Refill    clobetasoL (CLOBEX) 0.05 % shampoo Apply 1 application topically once daily.      clobetasoL (TEMOVATE) 0.05 % cream       folic acid (FOLVITE) 1 MG tablet Take 1 tablet (1 mg total) by mouth once daily. 90 tablet 3    lamoTRIgine (LAMICTAL) 200 MG tablet Take 1 tablet (200 mg total) by mouth 2 (two) times daily. 180 tablet 3    levETIRAcetam (KEPPRA) 1000 MG tablet Take 1.5 tablets (1,500 mg total) by mouth 2 (two) times daily. 270 tablet 3    medroxyPROGESTERone (DEPO-PROVERA) 150 mg/mL Syrg BRING TO OFFICE FOR ADMINISTRATION       No current facility-administered medications for this visit.        FAMILY HISTORY:   Family History   Problem Relation Age of Onset    Cancer Maternal Grandmother     Migraines Mother          SOCIAL HISTORY:   Social History     Socioeconomic History    Marital status: Single   Tobacco Use    Smoking status: Never Smoker    Smokeless tobacco: Never Used   Substance and Sexual Activity    Alcohol use: No    Drug use: No    Sexual activity: Not Currently   Social History Narrative    8th grade. Struggles in school         Questions and concerns raised by the patient and family/care-giver(s) were addressed and they indicated understanding of everything discussed and agreed to plans as above.    Elissa Jose PA-C   Neurology-Epilepsy  [facility]    Collaborating physician, Dr. Bettye Bender, was available during today's encounter.     I spent approximately 60 minutes on the day of this encounter preparing to see the patient, obtaining and reviewing history and results, performing a medically appropriate exam,  counseling and educating the patient/family/caregiver, documenting clinical information, coordinating care, and ordering medications, tests, procedures, and referrals.

## 2022-06-09 NOTE — PATIENT INSTRUCTIONS
"You came to Epilepsy Clinic because of your seizure disorder. Your seizures are well controlled on lamotrigine 200 mg twice daily, keppra 1500 mg twice daily, and a VNS.    Do not miss any doses of your medication. If you do miss a dose, take it as soon as you remember even if that means doubling up on the dose. Take folic acid 1mg daily. Please get regular sleep. Go to sleep at the same time and wake up at the same time every day. People with epilepsy require more sleep than people without epilepsy.  Sleeping 10-12 hours a day can be normal for a person with epilepsy.      Try looking up "Good Rx" to help with cost of medications.     I recommend trying hydrocortisone cream for your rash and following up with dermatology.     Per [place] law, no episodes of loss of consciousness for 6 months before you may drive.  Please avoid dangerous situations.  For example, no baths/pools by yourself, no heights, no power tools.  Please wear a bike helmet.  If you have a single breakthrough seizure, please patient portal me or call the office and we will decide the next steps. If you have multiple seizures in a row and do not return back to baseline, 911 needs to be called.     Return to clinic in one year or sooner with issues.  Please patient portal with any questions or concerns.    Elissa Jose PA-C   Neurology-Epilepsy  [facility]  "

## 2022-06-16 ENCOUNTER — PATIENT MESSAGE (OUTPATIENT)
Dept: NEUROLOGY | Facility: CLINIC | Age: 22
End: 2022-06-16
Payer: COMMERCIAL

## 2022-07-22 ENCOUNTER — PATIENT MESSAGE (OUTPATIENT)
Dept: NEUROLOGY | Facility: CLINIC | Age: 22
End: 2022-07-22
Payer: COMMERCIAL

## 2022-09-26 ENCOUNTER — PATIENT MESSAGE (OUTPATIENT)
Dept: NEUROLOGY | Facility: CLINIC | Age: 22
End: 2022-09-26
Payer: COMMERCIAL

## 2022-10-27 ENCOUNTER — PATIENT MESSAGE (OUTPATIENT)
Dept: NEUROLOGY | Facility: CLINIC | Age: 22
End: 2022-10-27
Payer: COMMERCIAL

## 2022-11-02 ENCOUNTER — PATIENT MESSAGE (OUTPATIENT)
Dept: NEUROLOGY | Facility: CLINIC | Age: 22
End: 2022-11-02
Payer: COMMERCIAL

## 2022-11-02 DIAGNOSIS — G40.909 NONINTRACTABLE EPILEPSY WITHOUT STATUS EPILEPTICUS, UNSPECIFIED EPILEPSY TYPE: ICD-10-CM

## 2022-11-02 RX ORDER — LAMOTRIGINE 200 MG/1
200 TABLET ORAL 2 TIMES DAILY
Qty: 180 TABLET | Refills: 3 | Status: SHIPPED | OUTPATIENT
Start: 2022-11-02 | End: 2023-02-01 | Stop reason: SDUPTHER

## 2022-11-29 ENCOUNTER — PATIENT MESSAGE (OUTPATIENT)
Dept: NEUROLOGY | Facility: CLINIC | Age: 22
End: 2022-11-29
Payer: COMMERCIAL

## 2022-11-29 NOTE — TELEPHONE ENCOUNTER
Letter requesting accommodations for school including extended test taking time, clear straightforward directions, unscheduled break/absences.    Bettye Bender MD PhD  Neurology-Epilepsy  [facility].

## 2023-01-08 ENCOUNTER — PATIENT MESSAGE (OUTPATIENT)
Dept: NEUROLOGY | Facility: CLINIC | Age: 23
End: 2023-01-08
Payer: COMMERCIAL

## 2023-02-01 ENCOUNTER — PATIENT MESSAGE (OUTPATIENT)
Dept: NEUROLOGY | Facility: CLINIC | Age: 23
End: 2023-02-01
Payer: COMMERCIAL

## 2023-02-01 DIAGNOSIS — G40.909 NONINTRACTABLE EPILEPSY WITHOUT STATUS EPILEPTICUS, UNSPECIFIED EPILEPSY TYPE: ICD-10-CM

## 2023-02-01 RX ORDER — LAMOTRIGINE 200 MG/1
200 TABLET ORAL 2 TIMES DAILY
Qty: 180 TABLET | Refills: 3 | Status: SHIPPED | OUTPATIENT
Start: 2023-02-01 | End: 2023-10-23

## 2023-05-04 ENCOUNTER — OFFICE VISIT (OUTPATIENT)
Dept: NEUROLOGY | Facility: CLINIC | Age: 23
End: 2023-05-04
Payer: COMMERCIAL

## 2023-05-04 DIAGNOSIS — Z96.89 S/P PLACEMENT OF VNS (VAGUS NERVE STIMULATION) DEVICE: ICD-10-CM

## 2023-05-04 DIAGNOSIS — G40.909 NONINTRACTABLE EPILEPSY WITHOUT STATUS EPILEPTICUS, UNSPECIFIED EPILEPSY TYPE: Primary | ICD-10-CM

## 2023-05-04 PROCEDURE — 99214 PR OFFICE/OUTPT VISIT, EST, LEVL IV, 30-39 MIN: ICD-10-PCS | Mod: 95,,,

## 2023-05-04 PROCEDURE — 99214 OFFICE O/P EST MOD 30 MIN: CPT | Mod: 95,,,

## 2023-05-04 RX ORDER — LEVETIRACETAM 1000 MG/1
1500 TABLET ORAL 2 TIMES DAILY
Qty: 270 TABLET | Refills: 3 | Status: SHIPPED | OUTPATIENT
Start: 2023-05-04 | End: 2024-04-28

## 2023-05-04 NOTE — PROGRESS NOTES
Name: Diana Trivedi  MRN:[id number]   CSN: [id number]  Date of service: 5/4/2023  Age:23 y.o.   Gender:female   Referring Physician/Service: No referring provider defined for this encounter.   The patient is evaluated today with: self    Neurology Virtual Clinic: Follow-up Visit    Interval Events/ROS 5/4/2023:    Current ASM/SEs: lamotrigine 200mg BID and levetiracetam 1500mg BID; no SE  Breakthrough seizures/events: no known seizures though does mention L arm and hand twitching 5/2/23 that occurred at work ([employer]) for ~30 minutes, maintained awareness, felt like a muscle spasm, sometimes her eyelid does the same thing, no identifiable trigger but possibly poor sleep   Driving: yes  Folic acid: yes  Sleep: intermittent difficulty falling asleep  Mood: overall good     Gradual vision changes, notes new prescription recently from eye doctor. Otherwise, no fever, no cold symptoms, no headache, no new weakness, no chest pain, no shortness of breath, no nausea, no vomiting, no diarrhea, no constipation, no tingling/numbness, no problems walking.    Recent Labs   Lab 08/27/20  1630 04/28/22  1315   Levetiracetam Lvl 25.8 38.3   Lamotrigine Lvl 6.9 6.8      Interval Events/ROS 6/9/2022:     Patient presents today accompanied by dad in follow up for epilepsy. Last seizure was 11/2017. Epilepsy has been well controlled on current regimen of lamotrigine 200mg BID, levetiracetam 1500mg BID, and a VNS. No reported side effects. Taking folic acid daily. Patient has no complaints today other than a rash to back of thighs and ankles bilaterally. Pruritic. Noticed it two days ago after sitting outside for hours in a mesh chair. Scheduled to see dermatology in two weeks. Working at [employer], would appreciate letter requesting no morning shifts earlier than 7am as patient feels she is not able to get enough sleep if required to get to work earlier than that. Otherwise, no fever, no cold symptoms, no headache, no  changes in vision, no new weakness, no chest pain, no shortness of breath, no nausea, no vomiting, no constipation, no tingling/numbness, no problems walking, no mood issues.    Interval Events/ROS 8/11/2021:    Things are going good. 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 Work is going well. No seizures.     Lamotrigine 200 mg twice daily  Levetiracetam 1500 mg twice daily  Folic acid 1 mg daily    No issues with the medications, able to get them without too much expense or trouble. VNS working well. COVID at the end of June. Headaches, right side of brain. Astigmatism. New glasses helped resolve headache. Constipated. Rash evaluated by derm -> eczema.  Much better with creams and shampoo. Otherwise, no fever, no cold symptoms, no new weakness, no chest pain, no shortness of breath, no nausea, no vomiting, no diarrhea, no tingling/numbness, no problems walking, no mood issues.    HPI 8/27/2020:     Age of first seizure: 9yo   Seizure Risk Factors: maternal 2nd cousin once removed and 3rd cousin once removed with seizures as a kid but controlled now, No head strike with LOC, no CNS infection, term (2weeks over due) no prolonged hospitalization    Time of Last Seizure: 2017 EMU stay with 3 seizures in 1 day, ripped toenail off. Head strike.   # of lifetime Seizures:  10 big stiffening episodes, hand rubbing face maybe more than 500  Frequency of Seizures: None in three years, before that every 5 minutes with the rubbing events   Seizure Triggers: missed medication, stress, poor sleep, seizures usually in the morning.   Injuries/Hospitalization for seizures?  Falls, bloody nose, head strike, ripped off toenail. 3 hospitalizations. 2 EMU stays.   Pregnancy? Maybe one day   Contraception? None, just quit because of weight gain   Folic Acid? Not currently on   Bone Health: no dexa     Auras: stomach will twirl, fingers curl, seconds before the seizure     Seizure Events:   1. Roaming  around, fusses, eyes big, hands go up towards chest, whole body stiffening, chewing, makes an ictal grunt, no urinary incontinence, tongue bite   2. Eyes rolling up, will not hear anything, stuttering, left hand rubbing face (stared in pre-K, not since starting keppra)    Current AED/SEs:  1. Keppra 1500mg twice daily SE: none   2. Lamotrigine 200mg BID SE: none     Previous AED/SEs or reason for DC.   1.  Zonisamide SE didn't work     VNS placed 7/2018  Output Current mA 1.5  Signal Freq Hz 20   Pulse width uSec 250  Signal on time Sec 30  Signal off time Min 5.0  Mag Current mA 1.75  Mag on time Sec 60  Pulse width uSec 500  Near EOS No   autostim 1.625  sens 30%   Battery %    EEG:   Eeg 01/2014 (result in media tab)- posterior dominant sharply contoured bursts noted intermittently, 1 to several seconds long epileptiform bursts of polyspike activity maximal over frontal head regions both generalized and more fragmented with shifting maxima, these findings are most consistent with a tendency to primary generalized seizures.  The presence of fragmented shifting bursts raises the question of a lateralize deep frontal focus of secondary generalization.  LTM 3/2014- multiple focal seizures right ant parasaggital also right posterior quadrant  2 Hour EEG 2/16/18- normal  Received long term EMU results off medications from [facility].  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 Migrating headaches. 2HA/per month, do not require treatment. Constipation. Otherwise, denies frequent eye blinking, loss of vision, blurred vision, diplopia, dysarthria, dysphagia, lightheadedness, vertigo, tinnitus or hearing difficulty. Denies difficulties producing or comprehending speech.  Denies focal weakness, numbness, paresthesias. Denies  difficulty with gait. Denies cough, shortness of breath.  Denies chest pain or tightness, palpitations.  Denies nausea, vomiting, diarrhea, or abdominal pain.  No bowel or bladder incontinence or retention.  No saddle anesthesia.  No falls.       EXAM:   - Vitals: There were no vitals taken for this visit.   - General: Awake, cooperative, NAD.  - HEENT: NC/AT  - Neck:  Free range of motion  - Pulmonary: no increased WOB  - Cardiac: well perfused   - Abdomen: nondistended  - Extremities:  Not examined  - Skin: no rashes or lesions noted.     NEURO EXAM:   - Mental Status: Awake, alert, oriented x 3. Able to relate history without difficulty. Language is fluent with intact repetition and comprehension. Normal prosody. There were no paraphasic errors. Able to name both high and low frequency objects. Speech was not dysarthric. Able to follow both midline and appendicular commands. There was no evidence of apraxia or neglect.    - Cranial Nerves:  Pupils not evaluated. EOMI. No facial droop. Hearing intact to room voice. Trapezii and SCM with free range of motion. Tongue protrudes in midline and to either side with no evidence of atrophy or weakness.    - Motor: No pronator drift bilaterally. No adventitious movements such as tremor or asterixis noted.  Free range of motion throughout with no obvious asymmetry.     - Sensory:  No subjective deficits  - DTRs: Unable to evaluate  - Coordination:  No obvious ataxia  - Gait:  Able to stand and ambulate around the room    PLAN:  23-year-old woman with epilepsy well controlled with VNS, levetiracetam 1500 mg twice daily, and lamotrigine 200 mg twice daily. No EEG tracing abnormalities to review however report of both generalized appearing discharges with eyelid myoclonia and focal characteristics (focal discharges and seizures) making definitive characterization unknown.  However, semiology seems most consistent with focal epilepsy (chewing, repetitive hand rubbing). Annual  levels/labs. Check VNS next visit. Folic acid 1 mg daily. Advised to reach out over the portal with any concerns. Patient is comfortable with plan. All questions and concerns are addressed at this time. Follow up in about 1 year (around 5/4/2024).     Patient Instructions   You came to Epilepsy Clinic because of your seizure disorder. Your seizures are well controlled on levetiracetam 1500 mg twice daily and lamotrigine 200 mg twice daily.  Please continue the same medications at the same dose.     Do not miss any doses of medication. If a dose of medication is missed, take it as soon as it is remembered even if that means doubling up on the dose. Please get a lab test to check out the blood level of medication. You can go to any [facility] lab to get this done. Take folic acid 1mg daily. Get regular sleep. Go to sleep at the same time and wake up at the same time every day. People with epilepsy require more sleep than people without epilepsy.  Sleeping 10-12 hours a day can be normal for a person with epilepsy.  Every seizure makes it harder to prevent the next seizure. Epilepsy is associated with SUDEP, or sudden unexpected death in epilepsy.  The risk is significantly higher if convulsive seizures are not well controlled. For more information, check out these websites: https://www.epilepsy.com/, https://www.epilepsyallianceamerica.org/, www.jona-epilepsy.org, www.womenandepilepsy.org.      Videos are always very helpful. If your arm starts twitching again you can send a video of it through the portal for us to evaluate.     Per [place] law, no episodes of loss of consciousness for 6 months before driving.  Avoid dangerous situations.  For example, no baths/pools alone, no heights, no power tools.  Wear a bike helmet.  If breakthrough seizures occur that are different in character, frequency, or duration from normal episodes, please patient portal me or call the office and we will decide the next steps. If multiple  seizures occur in a row without return back to baseline, 911 needs to be called.     Return to clinic in one year or sooner with issues. Please patient portal with any questions or concerns.    Elissa Jose PA-C   Neurology-Epilepsy  [facility]    Forms/Letters/Disability/DMV Paperwork: We understand the importance of filling out forms and providing letters in a timely manner.  However, many of these forms have very tricky language and once an official form is submitted as part of the medical record, it can not be modified or erased.  Please work with us in order to get these forms filled out in the most complete, accurate, and efficient way. 1.  Once you are aware that a form will need to be completed, please make an appointment.  A virtual appointment with Dr. Bender or Elda is perfectly fine. 2.  Please fill out the form as much as you can.  There are many questions that we do not have an answer for.  Please bring a blank copy of the form and your partially filled out form to the clinic visit or send them to us over the portal.  We will complete the form together with you during the clinic visit, sign it, and either return it to you or send it to the correct destination.  Every form will require an appointment however we can fill out multiple forms at once if needed.  Please do not hesitate to reach out with any questions or concerns about this policy.  We are trying to make sure that we have a system in place to meet this need which works for everyone involved.  Thank you for your understanding.       Problem List Items Addressed This Visit          Neuro    Nonintractable epilepsy without status epilepticus - Primary    Relevant Medications    levETIRAcetam (KEPPRA) 1000 MG tablet    Other Relevant Orders    Comprehensive metabolic panel    CBC auto differential    Lamotrigine level    Levetiracetam level    S/P placement of VNS (vagus nerve stimulation) device     The patient location  is: Home  The chief complaint leading to consultation is: Epilepsy  Visit type: Virtual visit with synchronous audio and video  Total time spent with patient: 12 minutes  Each patient to whom he or she provides medical services by telemedicine is:  (1) informed of the relationship between the physician and patient and the respective role of any other health care provider with respect to management of the patient; and (2) notified that he or she may decline to receive medical services by telemedicine and may withdraw from such care at any time.    Disclaimer: This note has been generated using voice-recognition software. There may be typographical errors that were missed during proof-reading.     LABS:        Recent Labs   Lab 08/27/20  1630 04/28/22  1315   Levetiracetam Lvl 25.8 38.3   Lamotrigine Lvl 6.9 6.8        IMAGING:  Recent imaging is personally reviewed with the patient.    Eeg 01/2014 (result in media tab)- posterior dominant sharply contoured bursts noted intermittently, 1 to several seconds long epileptiform bursts of polyspike activity maximal over frontal head regions both generalized and more fragmented with shifting maxima, these findings are most consistent with a tendency to primary generalized seizures.  The presence of fragmented shifting bursts raises the question of a lateralize deep frontal focus of secondary generalization.  LTM 3/2014- multiple focal seizures right ant parasaggital also right posterior quadrant  2 Hour EEG 2/16/18- normal  Received long term EMU results off medications from [facility].  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 consitent with Michelle nascimento     MRI 12/27/13 normal      PAST MEDICAL HISTORY:   Active Ambulatory Problems     Diagnosis Date Noted    Nonintractable epilepsy without status epilepticus 07/09/2018    S/P placement of VNS (vagus nerve stimulation) device 08/14/2018    Eczema 08/11/2021     Resolved Ambulatory Problems     Diagnosis  Date Noted    Partial epilepsy with impairment of consciousness 03/06/2015    Generalized epilepsy, intractable 05/17/2018     Past Medical History:   Diagnosis Date    Developmental delay     Seizures         PAST SURGICAL HISTORY:   Past Surgical History:   Procedure Laterality Date    VAGUS NERVE STIMULATOR INSERTION N/A 7/9/2018    Procedure: INSERTION, NEUROSTIMULATOR, VAGAL;  Surgeon: Mat Bernardo MD;  Location: [facility] OR [address] [place] [address];  Service: Neurosurgery;  Laterality: N/A;  toronto II, ASA 2, regular bed, supine, [place]        ALLERGIES: Patient has no known allergies.   CURRENT MEDICATIONS:   Current Outpatient Medications   Medication Sig Dispense Refill    clobetasoL (TEMOVATE) 0.05 % cream       folic acid (FOLVITE) 1 MG tablet Take 1 tablet (1 mg total) by mouth once daily. 90 tablet 3    lamoTRIgine (LAMICTAL) 200 MG tablet Take 1 tablet (200 mg total) by mouth 2 (two) times daily. 180 tablet 3    levETIRAcetam (KEPPRA) 1000 MG tablet Take 1.5 tablets (1,500 mg total) by mouth 2 (two) times daily. 270 tablet 3    medroxyPROGESTERone (DEPO-PROVERA) 150 mg/mL Syrg BRING TO OFFICE FOR ADMINISTRATION      OTEZLA 30 mg Tab Take 1 tablet by mouth 2 (two) times daily.       No current facility-administered medications for this visit.        FAMILY HISTORY:   Family History   Problem Relation Age of Onset    Cancer Maternal Grandmother     Migraines Mother          SOCIAL HISTORY:   Social History     Socioeconomic History    Marital status: Single   Tobacco Use    Smoking status: Never    Smokeless tobacco: Never   Substance and Sexual Activity    Alcohol use: No    Drug use: No    Sexual activity: Not Currently   Social History Narrative    8th grade. Struggles in school      Questions and concerns raised by the patient and family/care-giver(s) were addressed and they indicated understanding of everything discussed and agreed to plans as above.    Elissa Jose PA-C   Neurology-Epilepsy  [facility]    Collaborating physician, Dr. Bettye Bender, was available during today's encounter.     I spent approximately 35 minutes on the day of this encounter preparing to see the patient, obtaining and reviewing history and results, performing a medically appropriate exam, counseling and educating the patient/family/caregiver, documenting clinical information, coordinating care, and ordering medications, tests, procedures, and referrals.

## 2023-05-04 NOTE — PATIENT INSTRUCTIONS
You came to Epilepsy Clinic because of your seizure disorder. Your seizures are well controlled on levetiracetam 1500 mg twice daily and lamotrigine 200 mg twice daily.  Please continue the same medications at the same dose.     Do not miss any doses of medication. If a dose of medication is missed, take it as soon as it is remembered even if that means doubling up on the dose. Please get a lab test to check out the blood level of medication. You can go to any [facility] lab to get this done. Take folic acid 1mg daily. Get regular sleep. Go to sleep at the same time and wake up at the same time every day. People with epilepsy require more sleep than people without epilepsy.  Sleeping 10-12 hours a day can be normal for a person with epilepsy.  Every seizure makes it harder to prevent the next seizure. Epilepsy is associated with SUDEP, or sudden unexpected death in epilepsy.  The risk is significantly higher if convulsive seizures are not well controlled. For more information, check out these websites: https://www.epilepsy.com/, https://www.epilepsyallianceamerica.org/, www.jona-epilepsy.org, www.womenandepilepsy.org.      Videos are always very helpful. If your arm starts twitching again you can send a video of it through the portal for us to evaluate.     Per [place] law, no episodes of loss of consciousness for 6 months before driving.  Avoid dangerous situations.  For example, no baths/pools alone, no heights, no power tools.  Wear a bike helmet.  If breakthrough seizures occur that are different in character, frequency, or duration from normal episodes, please patient portal me or call the office and we will decide the next steps. If multiple seizures occur in a row without return back to baseline, 911 needs to be called.     Return to clinic in one year or sooner with issues. Please patient portal with any questions or concerns.    Elissa Jose PA-C   Neurology-Epilepsy  [facility]    Forms/Letters/Disability/DMV Paperwork: We understand the importance of filling out forms and providing letters in a timely manner.  However, many of these forms have very tricky language and once an official form is submitted as part of the medical record, it can not be modified or erased.  Please work with us in order to get these forms filled out in the most complete, accurate, and efficient way. 1.  Once you are aware that a form will need to be completed, please make an appointment.  A virtual appointment with Dr. Bender or Elda is perfectly fine. 2.  Please fill out the form as much as you can.  There are many questions that we do not have an answer for.  Please bring a blank copy of the form and your partially filled out form to the clinic visit or send them to us over the portal.  We will complete the form together with you during the clinic visit, sign it, and either return it to you or send it to the correct destination.  Every form will require an appointment however we can fill out multiple forms at once if needed.  Please do not hesitate to reach out with any questions or concerns about this policy.  We are trying to make sure that we have a system in place to meet this need which works for everyone involved.  Thank you for your understanding.

## 2023-06-22 DIAGNOSIS — G40.909 NONINTRACTABLE EPILEPSY WITHOUT STATUS EPILEPTICUS, UNSPECIFIED EPILEPSY TYPE: ICD-10-CM

## 2023-06-22 RX ORDER — FOLIC ACID 1 MG/1
TABLET ORAL
Qty: 90 TABLET | Refills: 3 | Status: SHIPPED | OUTPATIENT
Start: 2023-06-22

## 2023-08-18 NOTE — PROGRESS NOTES
Chief Complaint: Contraception Counseling     HPI:     Diana is a 23 y.o.  who presents today to discuss contraceptive options. She is currently using Depo-Provera for contraception. She feels she is having weight gain with depo. She has been on it since , with a 10 month break in . States she does not have cycles with depo. She would also like an option that won't delay pregnancy once she is ready to conceive - however, not interested in currently. Pt has h/o seizures, on Keppra and lamictal.   Reports cycles started around age 10. Prior to depo, she would get very sick with cycle and had very heavy bleeding. Tried OCP in  but d/c. She notes cycles were heavy on her depo break in .   She requested STD screening today. Last pap:  [facility] - hpv. She iss/p the HPV vaccine series.     Past Medical History:   Diagnosis Date    Developmental delay     Seizures      Family History   Problem Relation Age of Onset    Migraines Mother     Hypertension Mother     Endometriosis Mother     Cancer Maternal Grandmother     Colon cancer Maternal Grandmother     Breast cancer Paternal Grandmother      Social History     Tobacco Use    Smoking status: Never    Smokeless tobacco: Never   Substance Use Topics    Alcohol use: No    Drug use: No       ROS:     GENERAL: Denies fevers or chills. Feeling well overall.   HEENT: denies h/o migraine.  URINARY: Denies frequency, dysuria, hematuria.  GYNECOLOGIC: denies heavy menses. denies dysmenorrhea.  DERMATOLOGIC: denies acne.     Physical Exam:      PHYSICAL EXAM:  /80   Wt 85.6 kg (188 lb 13.2 oz)   BMI 30.71 kg/m²  Body mass index is 30.71 kg/m².  APPEARANCE: Well nourished, well developed, in no acute distress.  PSYCH: Appropriate mood and affect.  EXTREMITIES: No edema.   URETHRA: normal size and location, no lesions  BLADDER: no masses or tenderness.   VULVA: normal appearing vulva with no masses, tenderness or lesions   VAGINA: normal appearing  vagina with normal color and mucosa . No discharge, no lesions   CERVIX: normal appearing cervix without discharge or lesions   UTERUS: uterus is normal size, shape, consistency and +tender   ADNEXA: normal adnexa in size, nontender and no masses    Assessment/Plan:     Diana was seen today for well woman and contraception.    Diagnoses and all orders for this visit:    Encounter for initial prescription of intrauterine contraceptive device (IUD)  -     Device Authorization Order    Encounter for screening examination for sexually transmitted disease  -     Vaginosis Screen by DNA Probe  -     C. trachomatis/N. gonorrhoeae by AMP DNA [facility]; Cervicovaginal    Pelvic pain  -     US Pelvis Comp with Transvag NON-OB (xpd; Future          Counseling:     Annual due in November.   Reviewed estrogen options CI with seizure medications.     The risks of, benefits of, and alternatives of various forms of contraception were discussed at this visit. After a discussion of the R/B/A of fertility awareness, barrier contraception, hormonal pills, injections, patches, rings, hormonal and non-hormonal IUDs, and the subdermal implant, all of  questions were answered, and she has opted for Effie IUD.    Condoms for STD protection were discussed, as was Plan B in the event of unprotected intercourse.   Recommendations for STD screening based on Diana's age and sexual habits were reviewed.  Recommendations for HPV vaccine based on Diana's age and sexual habits were reviewed.    30 minutes of face to face counseling occurred during today's visit.

## 2023-08-21 ENCOUNTER — OFFICE VISIT (OUTPATIENT)
Dept: OBSTETRICS AND GYNECOLOGY | Facility: CLINIC | Age: 23
End: 2023-08-21
Payer: COMMERCIAL

## 2023-08-21 ENCOUNTER — PATIENT MESSAGE (OUTPATIENT)
Dept: OBSTETRICS AND GYNECOLOGY | Facility: CLINIC | Age: 23
End: 2023-08-21

## 2023-08-21 VITALS
DIASTOLIC BLOOD PRESSURE: 80 MMHG | WEIGHT: 188.81 LBS | SYSTOLIC BLOOD PRESSURE: 120 MMHG | BODY MASS INDEX: 30.71 KG/M2

## 2023-08-21 DIAGNOSIS — Z30.014 ENCOUNTER FOR INITIAL PRESCRIPTION OF INTRAUTERINE CONTRACEPTIVE DEVICE (IUD): Primary | ICD-10-CM

## 2023-08-21 DIAGNOSIS — R10.2 PELVIC PAIN: ICD-10-CM

## 2023-08-21 DIAGNOSIS — Z11.3 ENCOUNTER FOR SCREENING EXAMINATION FOR SEXUALLY TRANSMITTED DISEASE: ICD-10-CM

## 2023-08-21 PROCEDURE — 3008F PR BODY MASS INDEX (BMI) DOCUMENTED: ICD-10-PCS | Mod: CPTII,S$GLB,, | Performed by: PHYSICIAN ASSISTANT

## 2023-08-21 PROCEDURE — 3074F SYST BP LT 130 MM HG: CPT | Mod: CPTII,S$GLB,, | Performed by: PHYSICIAN ASSISTANT

## 2023-08-21 PROCEDURE — 1160F PR REVIEW ALL MEDS BY PRESCRIBER/CLIN PHARMACIST DOCUMENTED: ICD-10-PCS | Mod: CPTII,S$GLB,, | Performed by: PHYSICIAN ASSISTANT

## 2023-08-21 PROCEDURE — 3079F PR MOST RECENT DIASTOLIC BLOOD PRESSURE 80-89 MM HG: ICD-10-PCS | Mod: CPTII,S$GLB,, | Performed by: PHYSICIAN ASSISTANT

## 2023-08-21 PROCEDURE — 87591 N.GONORRHOEAE DNA AMP PROB: CPT | Performed by: PHYSICIAN ASSISTANT

## 2023-08-21 PROCEDURE — 3074F PR MOST RECENT SYSTOLIC BLOOD PRESSURE < 130 MM HG: ICD-10-PCS | Mod: CPTII,S$GLB,, | Performed by: PHYSICIAN ASSISTANT

## 2023-08-21 PROCEDURE — 1160F RVW MEDS BY RX/DR IN RCRD: CPT | Mod: CPTII,S$GLB,, | Performed by: PHYSICIAN ASSISTANT

## 2023-08-21 PROCEDURE — 3008F BODY MASS INDEX DOCD: CPT | Mod: CPTII,S$GLB,, | Performed by: PHYSICIAN ASSISTANT

## 2023-08-21 PROCEDURE — 1159F PR MEDICATION LIST DOCUMENTED IN MEDICAL RECORD: ICD-10-PCS | Mod: CPTII,S$GLB,, | Performed by: PHYSICIAN ASSISTANT

## 2023-08-21 PROCEDURE — 99204 OFFICE O/P NEW MOD 45 MIN: CPT | Mod: S$GLB,,, | Performed by: PHYSICIAN ASSISTANT

## 2023-08-21 PROCEDURE — 99999 PR PBB SHADOW E&M-EST. PATIENT-LVL III: CPT | Mod: PBBFAC,,, | Performed by: PHYSICIAN ASSISTANT

## 2023-08-21 PROCEDURE — 99204 PR OFFICE/OUTPT VISIT, NEW, LEVL IV, 45-59 MIN: ICD-10-PCS | Mod: S$GLB,,, | Performed by: PHYSICIAN ASSISTANT

## 2023-08-21 PROCEDURE — 99999 PR PBB SHADOW E&M-EST. PATIENT-LVL III: ICD-10-PCS | Mod: PBBFAC,,, | Performed by: PHYSICIAN ASSISTANT

## 2023-08-21 PROCEDURE — 81514 NFCT DS BV&VAGINITIS DNA ALG: CPT | Performed by: PHYSICIAN ASSISTANT

## 2023-08-21 PROCEDURE — 1159F MED LIST DOCD IN RCRD: CPT | Mod: CPTII,S$GLB,, | Performed by: PHYSICIAN ASSISTANT

## 2023-08-21 PROCEDURE — 3079F DIAST BP 80-89 MM HG: CPT | Mod: CPTII,S$GLB,, | Performed by: PHYSICIAN ASSISTANT

## 2023-08-22 LAB
C TRACH DNA SPEC QL NAA+PROBE: NOT DETECTED
N GONORRHOEA DNA SPEC QL NAA+PROBE: NOT DETECTED

## 2023-08-23 LAB
BACTERIAL VAGINOSIS DNA: NEGATIVE
CANDIDA GLABRATA DNA: NEGATIVE
CANDIDA KRUSEI DNA: NEGATIVE
CANDIDA RRNA VAG QL PROBE: NEGATIVE
T VAGINALIS RRNA GENITAL QL PROBE: NEGATIVE

## 2023-09-11 ENCOUNTER — PROCEDURE VISIT (OUTPATIENT)
Dept: OBSTETRICS AND GYNECOLOGY | Facility: CLINIC | Age: 23
End: 2023-09-11
Payer: COMMERCIAL

## 2023-09-11 VITALS — WEIGHT: 183.88 LBS | BODY MASS INDEX: 29.9 KG/M2 | DIASTOLIC BLOOD PRESSURE: 78 MMHG | SYSTOLIC BLOOD PRESSURE: 118 MMHG

## 2023-09-11 DIAGNOSIS — Z30.430 ENCOUNTER FOR IUD INSERTION: Primary | ICD-10-CM

## 2023-09-11 PROCEDURE — 99499 UNLISTED E&M SERVICE: CPT | Mod: S$GLB,,, | Performed by: OBSTETRICS & GYNECOLOGY

## 2023-09-11 PROCEDURE — 99499 NO LOS: ICD-10-PCS | Mod: S$GLB,,, | Performed by: OBSTETRICS & GYNECOLOGY

## 2023-09-11 PROCEDURE — 58300 INSERT INTRAUTERINE DEVICE: CPT | Mod: S$GLB,,, | Performed by: OBSTETRICS & GYNECOLOGY

## 2023-09-11 PROCEDURE — 58300 INSERTION OF IUD: ICD-10-PCS | Mod: S$GLB,,, | Performed by: OBSTETRICS & GYNECOLOGY

## 2023-09-11 NOTE — PROCEDURES
Insertion of IUD    Date/Time: 9/11/2023 2:15 PM    Performed by: Trupti Harris MD  Authorized by: Trupti Harris MD    Consent:     Consent given by:  Patient    Procedure risks and benefits discussed: yes      Patient questions answered: yes      Patient agrees, verbalizes understanding, and wants to proceed: yes      Educational handouts given: yes      Instructions and paperwork completed: yes    Procedure:     Pelvic exam performed: yes      Negative GC/chlamydia test: yes      Negative urine pregnancy test: yes      Cervix cleaned and prepped: yes      Speculum placed in vagina: yes      Tenaculum applied to cervix: yes      Uterus sounded: yes      Uterus sound depth (cm):  8    IUD inserted with no complications: yes      IUD type:  Effie    Strings trimmed: yes    14 mcg levonorgestreL 14 mcg/24 hrs (3 yrs) 13.5 mg     Post-procedure:     Patient tolerated procedure well: yes      Patient will follow up after next period: yes

## 2023-10-10 ENCOUNTER — OFFICE VISIT (OUTPATIENT)
Dept: OBSTETRICS AND GYNECOLOGY | Facility: CLINIC | Age: 23
End: 2023-10-10
Payer: COMMERCIAL

## 2023-10-10 VITALS — BODY MASS INDEX: 30.4 KG/M2 | WEIGHT: 186.94 LBS | SYSTOLIC BLOOD PRESSURE: 110 MMHG | DIASTOLIC BLOOD PRESSURE: 78 MMHG

## 2023-10-10 DIAGNOSIS — Z30.431 IUD CHECK UP: Primary | ICD-10-CM

## 2023-10-10 PROCEDURE — 99999 PR PBB SHADOW E&M-EST. PATIENT-LVL III: ICD-10-PCS | Mod: PBBFAC,,, | Performed by: OBSTETRICS & GYNECOLOGY

## 2023-10-10 PROCEDURE — 3074F PR MOST RECENT SYSTOLIC BLOOD PRESSURE < 130 MM HG: ICD-10-PCS | Mod: CPTII,S$GLB,, | Performed by: OBSTETRICS & GYNECOLOGY

## 2023-10-10 PROCEDURE — 3008F PR BODY MASS INDEX (BMI) DOCUMENTED: ICD-10-PCS | Mod: CPTII,S$GLB,, | Performed by: OBSTETRICS & GYNECOLOGY

## 2023-10-10 PROCEDURE — 3008F BODY MASS INDEX DOCD: CPT | Mod: CPTII,S$GLB,, | Performed by: OBSTETRICS & GYNECOLOGY

## 2023-10-10 PROCEDURE — 1159F PR MEDICATION LIST DOCUMENTED IN MEDICAL RECORD: ICD-10-PCS | Mod: CPTII,S$GLB,, | Performed by: OBSTETRICS & GYNECOLOGY

## 2023-10-10 PROCEDURE — 3078F PR MOST RECENT DIASTOLIC BLOOD PRESSURE < 80 MM HG: ICD-10-PCS | Mod: CPTII,S$GLB,, | Performed by: OBSTETRICS & GYNECOLOGY

## 2023-10-10 PROCEDURE — 99999 PR PBB SHADOW E&M-EST. PATIENT-LVL III: CPT | Mod: PBBFAC,,, | Performed by: OBSTETRICS & GYNECOLOGY

## 2023-10-10 PROCEDURE — 3078F DIAST BP <80 MM HG: CPT | Mod: CPTII,S$GLB,, | Performed by: OBSTETRICS & GYNECOLOGY

## 2023-10-10 PROCEDURE — 99212 OFFICE O/P EST SF 10 MIN: CPT | Mod: S$GLB,,, | Performed by: OBSTETRICS & GYNECOLOGY

## 2023-10-10 PROCEDURE — 1159F MED LIST DOCD IN RCRD: CPT | Mod: CPTII,S$GLB,, | Performed by: OBSTETRICS & GYNECOLOGY

## 2023-10-10 PROCEDURE — 99212 PR OFFICE/OUTPT VISIT, EST, LEVL II, 10-19 MIN: ICD-10-PCS | Mod: S$GLB,,, | Performed by: OBSTETRICS & GYNECOLOGY

## 2023-10-10 PROCEDURE — 3074F SYST BP LT 130 MM HG: CPT | Mod: CPTII,S$GLB,, | Performed by: OBSTETRICS & GYNECOLOGY

## 2023-10-10 NOTE — PROGRESS NOTES
SUBJECTIVE:   23 y.o. female   for iud check    Patient's last menstrual period was 2023 (exact date)..        Effie placed 23  Doing well, light bleeding and now subsided    OB History    Para Term  AB Living   0 0 0 0 0 0   SAB IAB Ectopic Multiple Live Births   0 0 0 0 0     Past Medical History:   Diagnosis Date    Developmental delay     Seizures      Past Surgical History:   Procedure Laterality Date    VAGUS NERVE STIMULATOR INSERTION N/A 2018    Procedure: INSERTION, NEUROSTIMULATOR, VAGAL;  Surgeon: Mat Bernardo MD;  Location: [facility] OR [address] [place] [address];  Service: Neurosurgery;  Laterality: N/A;  toronto II, ASA 2, regular bed, supine, Che Liva Nova     Social History     Socioeconomic History    Marital status: Single   Tobacco Use    Smoking status: Never    Smokeless tobacco: Never   Substance and Sexual Activity    Alcohol use: No    Drug use: No    Sexual activity: Not Currently   Social History Narrative    8th grade. Struggles in school     Family History   Problem Relation Age of Onset    Migraines Mother     Hypertension Mother     Endometriosis Mother     Cancer Maternal Grandmother     Colon cancer Maternal Grandmother     Breast cancer Paternal Grandmother          Current Outpatient Medications   Medication Sig Dispense Refill    clobetasoL (TEMOVATE) 0.05 % cream       folic acid (FOLVITE) 1 MG tablet TAKE 1 TABLET BY MOUTH EVERY DAY 90 tablet 3    lamoTRIgine (LAMICTAL) 200 MG tablet Take 1 tablet (200 mg total) by mouth 2 (two) times daily. 180 tablet 3    levETIRAcetam (KEPPRA) 1000 MG tablet Take 1.5 tablets (1,500 mg total) by mouth 2 (two) times daily. 270 tablet 3    medroxyPROGESTERone (DEPO-PROVERA) 150 mg/mL Syrg BRING TO OFFICE FOR ADMINISTRATION      OTEZLA 30 mg Tab Take 1 tablet by mouth 2 (two) times daily.       Current Facility-Administered Medications   Medication Dose Route Frequency Provider Last Rate Last Admin    levonorgestreL (EFFIE) 14  mcg/24 hrs (3 yrs) 13.5 mg IUD 14 mcg  14 mcg Intrauterine  Trupti Harris MD   14 mcg at 09/11/23 1415     Allergies: Patient has no known allergies.       ROS  ROS:  GENERAL: Denies weight gain or weight loss. Feeling well overall.   SKIN: Denies rash or lesions.   HEAD: Denies head injury or headache.   NODES: Denies enlarged lymph nodes.   CHEST: Denies chest pain or shortness of breath.   CARDIOVASCULAR: Denies palpitations or left sided chest pain.   ABDOMEN: No abdominal pain, constipation, diarrhea, nausea, vomiting or rectal bleeding.   URINARY: No frequency, dysuria, hematuria, or burning on urination.  REPRODUCTIVE: Denies vaginal discharge, abnormal vaginal bleeding, lesions, pelvic pain  BREASTS: The patient performs breast self-examination and denies pain, lumps, or nipple discharge.   HEMATOLOGIC: No easy bruisability or excessive bleeding.  MUSCULOSKELETAL: Denies joint pain or swelling.   NEUROLOGIC: Denies syncope or weakness.   PSYCHIATRIC: Denies depression, anxiety or mood swings.      OBJECTIVE:   /78   Wt 84.8 kg (186 lb 15.2 oz)   LMP 09/06/2023 (Exact Date)   BMI 30.40 kg/m²   The patient appears well, alert, oriented x 3, in no distress.  NECK: negative, no thyromegaly, trachea midline  SKIN: normal, good color, good turgor, and no acne, striae, hirsutism  BREAST EXAM: not examined  ABDOMEN: soft, non-tender; bowel sounds normal; no masses,  no organomegaly and no hernias, masses, or hepatosplenomegaly  BLADDER: soft  GENITALIA: normal external genitalia, no erythema, no discharge  URETHRA: normal appearing urethra with no masses, tenderness or lesions and normal urethra, normal urethral meatus  VAGINA: Normal  CERVIX: no lesions or cervical motion tenderness and IUD strings seen  UTERUS: normal size, contour, position, consistency, mobility, non-tender  ADNEXA: no mass, fullness, tenderness      ASSESSMENT:   Advised to check IUD strings monthly  Rtc in 11/2024 for pap/[facility]

## 2023-10-20 DIAGNOSIS — G40.909 NONINTRACTABLE EPILEPSY WITHOUT STATUS EPILEPTICUS, UNSPECIFIED EPILEPSY TYPE: ICD-10-CM

## 2023-10-23 RX ORDER — LAMOTRIGINE 200 MG/1
200 TABLET ORAL 2 TIMES DAILY
Qty: 180 TABLET | Refills: 3 | Status: SHIPPED | OUTPATIENT
Start: 2023-10-23

## 2023-10-23 NOTE — TELEPHONE ENCOUNTER
Lamotrigine 200 mg twice daily    Bettye Bender MD PhD [facility]  Neurology-Epilepsy  [facility].

## 2023-11-14 ENCOUNTER — OFFICE VISIT (OUTPATIENT)
Dept: OBSTETRICS AND GYNECOLOGY | Facility: CLINIC | Age: 23
End: 2023-11-14
Payer: COMMERCIAL

## 2023-11-14 VITALS — BODY MASS INDEX: 29.4 KG/M2 | SYSTOLIC BLOOD PRESSURE: 110 MMHG | WEIGHT: 180.75 LBS | DIASTOLIC BLOOD PRESSURE: 80 MMHG

## 2023-11-14 DIAGNOSIS — Z30.431 IUD CHECK UP: ICD-10-CM

## 2023-11-14 DIAGNOSIS — Z01.419 WELL WOMAN EXAM WITH ROUTINE GYNECOLOGICAL EXAM: Primary | ICD-10-CM

## 2023-11-14 DIAGNOSIS — Z12.4 ENCOUNTER FOR PAPANICOLAOU SMEAR FOR CERVICAL CANCER SCREENING: ICD-10-CM

## 2023-11-14 PROCEDURE — 99395 PREV VISIT EST AGE 18-39: CPT | Mod: S$GLB,,, | Performed by: OBSTETRICS & GYNECOLOGY

## 2023-11-14 PROCEDURE — 88175 CYTOPATH C/V AUTO FLUID REDO: CPT | Performed by: OBSTETRICS & GYNECOLOGY

## 2023-11-14 PROCEDURE — 3074F SYST BP LT 130 MM HG: CPT | Mod: CPTII,S$GLB,, | Performed by: OBSTETRICS & GYNECOLOGY

## 2023-11-14 PROCEDURE — 99395 PR PREVENTIVE VISIT,EST,18-39: ICD-10-PCS | Mod: S$GLB,,, | Performed by: OBSTETRICS & GYNECOLOGY

## 2023-11-14 PROCEDURE — 3008F BODY MASS INDEX DOCD: CPT | Mod: CPTII,S$GLB,, | Performed by: OBSTETRICS & GYNECOLOGY

## 2023-11-14 PROCEDURE — 99999 PR PBB SHADOW E&M-EST. PATIENT-LVL II: ICD-10-PCS | Mod: PBBFAC,,, | Performed by: OBSTETRICS & GYNECOLOGY

## 2023-11-14 PROCEDURE — 3008F PR BODY MASS INDEX (BMI) DOCUMENTED: ICD-10-PCS | Mod: CPTII,S$GLB,, | Performed by: OBSTETRICS & GYNECOLOGY

## 2023-11-14 PROCEDURE — 3074F PR MOST RECENT SYSTOLIC BLOOD PRESSURE < 130 MM HG: ICD-10-PCS | Mod: CPTII,S$GLB,, | Performed by: OBSTETRICS & GYNECOLOGY

## 2023-11-14 PROCEDURE — 3079F DIAST BP 80-89 MM HG: CPT | Mod: CPTII,S$GLB,, | Performed by: OBSTETRICS & GYNECOLOGY

## 2023-11-14 PROCEDURE — 99999 PR PBB SHADOW E&M-EST. PATIENT-LVL II: CPT | Mod: PBBFAC,,, | Performed by: OBSTETRICS & GYNECOLOGY

## 2023-11-14 PROCEDURE — 3079F PR MOST RECENT DIASTOLIC BLOOD PRESSURE 80-89 MM HG: ICD-10-PCS | Mod: CPTII,S$GLB,, | Performed by: OBSTETRICS & GYNECOLOGY

## 2023-11-14 NOTE — PROGRESS NOTES
SUBJECTIVE:   Diana Trivedi is a 23 y.o. female   for annual well woman exam. Patient's last menstrual period was 2023 (exact date)..  She has no unusual complaints.      Contraception: haven placed 23  Doing well  Able to check for iud strings    Past Medical History:   Diagnosis Date    Developmental delay     Seizures      Past Surgical History:   Procedure Laterality Date    VAGUS NERVE STIMULATOR INSERTION N/A 2018    Procedure: INSERTION, NEUROSTIMULATOR, VAGAL;  Surgeon: Mat Bernardo MD;  Location: [facility] OR [address] [place] [address];  Service: Neurosurgery;  Laterality: N/A;  toronto II, ASA 2, regular bed, supine, Che Breanne Randle     Social History     Socioeconomic History    Marital status: Single   Tobacco Use    Smoking status: Never    Smokeless tobacco: Never   Substance and Sexual Activity    Alcohol use: No    Drug use: No    Sexual activity: Not Currently   Social History Narrative    8th grade. Struggles in school     Family History   Problem Relation Age of Onset    Migraines Mother     Hypertension Mother     Endometriosis Mother     Cancer Maternal Grandmother     Colon cancer Maternal Grandmother     Breast cancer Paternal Grandmother      OB History    Para Term  AB Living   0 0 0 0 0 0   SAB IAB Ectopic Multiple Live Births   0 0 0 0 0         Current Outpatient Medications   Medication Sig Dispense Refill    clobetasoL (TEMOVATE) 0.05 % cream       folic acid (FOLVITE) 1 MG tablet TAKE 1 TABLET BY MOUTH EVERY DAY 90 tablet 3    lamoTRIgine (LAMICTAL) 200 MG tablet TAKE 1 TABLET BY MOUTH TWICE A  tablet 3    levETIRAcetam (KEPPRA) 1000 MG tablet Take 1.5 tablets (1,500 mg total) by mouth 2 (two) times daily. 270 tablet 3    medroxyPROGESTERone (DEPO-PROVERA) 150 mg/mL Syrg BRING TO OFFICE FOR ADMINISTRATION      OTEZLA 30 mg Tab Take 1 tablet by mouth 2 (two) times daily.       Current Facility-Administered Medications   Medication Dose Route Frequency  Provider Last Rate Last Admin    levonorgestreL (GORDON) 14 mcg/24 hrs (3 yrs) 13.5 mg IUD 14 mcg  14 mcg Intrauterine  Trupti Harris MD   14 mcg at 09/11/23 1415     Allergies: Patient has no known allergies.       ROS:  GENERAL: Denies weight gain or weight loss. Feeling well overall.   SKIN: Denies rash or lesions.   HEAD: Denies head injury or headache.   NODES: Denies enlarged lymph nodes.   CHEST: Denies chest pain or shortness of breath.   CARDIOVASCULAR: Denies palpitations or left sided chest pain.   ABDOMEN: No abdominal pain, constipation, diarrhea, nausea, vomiting or rectal bleeding.   URINARY: No frequency, dysuria, hematuria, or burning on urination.  REPRODUCTIVE: Denies vaginal discharge, abnormal vaginal bleeding, lesions, pelvic pain  BREASTS: The patient performs breast self-examination and denies pain, lumps, or nipple discharge.   HEMATOLOGIC: No easy bruisability or excessive bleeding.  MUSCULOSKELETAL: Denies joint pain or swelling.   NEUROLOGIC: Denies syncope or weakness.   PSYCHIATRIC: Denies depression, anxiety or mood swings.      OBJECTIVE:   /80   Wt 82 kg (180 lb 12.4 oz)   LMP 11/11/2023 (Exact Date)   BMI 29.40 kg/m²   The patient appears well, alert, oriented x 3, in no distress.  PSYCH:  Normal, full range of affect  NECK: negative, no thyromegaly, trachea midline  SKIN: normal, good color, good turgor and no acne, striae, hirsutism  BREAST EXAM: breasts appear normal, no suspicious masses, no skin or nipple changes or axillary nodes  ABDOMEN: soft, non-tender; bowel sounds normal; no masses,  no organomegaly and no hernias, masses, or hepatosplenomegaly  GENITALIA: normal external genitalia, no erythema, no discharge  BLADDER: soft  URETHRA: normal appearing urethra with no masses, tenderness or lesions and normal urethra, normal urethral meatus  VAGINA: Normal  CERVIX: no lesions or cervical motion tenderness, iud strings seen 3 cm long.    UTERUS: normal size,  contour, position, consistency, mobility, non-tender  ADNEXA: no mass, fullness, tenderness      ASSESSMENT:   .Diana was seen today for well woman.    Diagnoses and all orders for this visit:    Well woman exam with routine gynecological exam    Encounter for Papanicolaou smear for cervical cancer screening  -     Liquid-Based Pap Smear, Screening        1. Health maintenance  -pap done. Discussed ASCCP guideline screening every 3 - 5 years.   -screening std negative 8/2023  -counseled on exercise and healthy diet, weight loss  -bone health:  Discussed Vitamin D and Calcium supplementation, weight bearing exercises  2.  Discussed safety at home/school/work, healthy and balanced diet, sleep hygiene, as well as violence/weapons exposure.

## 2023-12-20 ENCOUNTER — PROCEDURE VISIT (OUTPATIENT)
Dept: OBSTETRICS AND GYNECOLOGY | Facility: CLINIC | Age: 23
End: 2023-12-20
Payer: COMMERCIAL

## 2023-12-20 VITALS
WEIGHT: 179.88 LBS | DIASTOLIC BLOOD PRESSURE: 80 MMHG | BODY MASS INDEX: 29.26 KG/M2 | SYSTOLIC BLOOD PRESSURE: 110 MMHG

## 2023-12-20 DIAGNOSIS — Z30.432 ENCOUNTER FOR IUD REMOVAL: Primary | ICD-10-CM

## 2023-12-20 PROCEDURE — 58301 REMOVE INTRAUTERINE DEVICE: CPT | Mod: S$GLB,,, | Performed by: OBSTETRICS & GYNECOLOGY

## 2023-12-20 PROCEDURE — 58301 REMOVAL OF IUD: ICD-10-PCS | Mod: S$GLB,,, | Performed by: OBSTETRICS & GYNECOLOGY

## 2023-12-20 NOTE — PROCEDURES
Removal of IUD    Date/Time: 12/20/2023 2:15 PM    Performed by: Trupti Harris MD  Authorized by: Trupti Harris MD    Consent given by:  Patient  Procedure risks and benefits discussed: yes    Patient questions answered: yes    Patient agrees, verbalizes understanding, and wants to proceed: yes    Educational handouts given: yes    Instructions and paperwork completed: yes    Implant grasped by: forceps  Removal due to infection and inflammatory reaction: no    Other reason for removal:  Can't lose weight  Removal due to mechanical complications of IUD/Nexplanon: no    Removed with no complications: yes  no

## 2023-12-22 ENCOUNTER — PATIENT MESSAGE (OUTPATIENT)
Dept: NEUROLOGY | Facility: CLINIC | Age: 23
End: 2023-12-22
Payer: COMMERCIAL

## 2024-05-20 ENCOUNTER — TELEPHONE (OUTPATIENT)
Dept: NEUROLOGY | Facility: CLINIC | Age: 24
End: 2024-05-20
Payer: COMMERCIAL

## 2024-05-20 NOTE — TELEPHONE ENCOUNTER
Gave the patient a call to schedule a follow up appointment with Dr. Bender. Patient did not answer so I left a voicemail asking her to return our call as soon as possible to get that appointment scheduled.

## 2024-05-20 NOTE — TELEPHONE ENCOUNTER
Patient returned my phone call. So I went ahead and scheduled her a follow up with Dr. Bender. Pt agreed to come on 5/30 at 1 pm.

## 2024-05-30 ENCOUNTER — OFFICE VISIT (OUTPATIENT)
Dept: NEUROLOGY | Facility: CLINIC | Age: 24
End: 2024-05-30
Payer: COMMERCIAL

## 2024-05-30 ENCOUNTER — LAB VISIT (OUTPATIENT)
Dept: LAB | Facility: HOSPITAL | Age: 24
End: 2024-05-30
Payer: COMMERCIAL

## 2024-05-30 DIAGNOSIS — G40.909 NONINTRACTABLE EPILEPSY WITHOUT STATUS EPILEPTICUS, UNSPECIFIED EPILEPSY TYPE: ICD-10-CM

## 2024-05-30 DIAGNOSIS — G40.909 NONINTRACTABLE EPILEPSY WITHOUT STATUS EPILEPTICUS, UNSPECIFIED EPILEPSY TYPE: Primary | ICD-10-CM

## 2024-05-30 DIAGNOSIS — Z96.89 S/P PLACEMENT OF VNS (VAGUS NERVE STIMULATION) DEVICE: ICD-10-CM

## 2024-05-30 LAB
25(OH)D3+25(OH)D2 SERPL-MCNC: 30 NG/ML (ref 30–96)
ALBUMIN SERPL BCP-MCNC: 4.7 G/DL (ref 3.5–5.2)
ALP SERPL-CCNC: 87 U/L (ref 55–135)
ALT SERPL W/O P-5'-P-CCNC: 18 U/L (ref 10–44)
ANION GAP SERPL CALC-SCNC: 11 MMOL/L (ref 8–16)
AST SERPL-CCNC: 15 U/L (ref 10–40)
BASOPHILS # BLD AUTO: 0.04 K/UL (ref 0–0.2)
BASOPHILS NFR BLD: 0.5 % (ref 0–1.9)
BILIRUB SERPL-MCNC: 0.6 MG/DL (ref 0.1–1)
BUN SERPL-MCNC: 10 MG/DL (ref 6–20)
CALCIUM SERPL-MCNC: 10.5 MG/DL (ref 8.7–10.5)
CHLORIDE SERPL-SCNC: 104 MMOL/L (ref 95–110)
CO2 SERPL-SCNC: 24 MMOL/L (ref 23–29)
CREAT SERPL-MCNC: 0.9 MG/DL (ref 0.5–1.4)
DIFFERENTIAL METHOD BLD: ABNORMAL
EOSINOPHIL # BLD AUTO: 0.2 K/UL (ref 0–0.5)
EOSINOPHIL NFR BLD: 1.9 % (ref 0–8)
ERYTHROCYTE [DISTWIDTH] IN BLOOD BY AUTOMATED COUNT: 11.5 % (ref 11.5–14.5)
EST. GFR  (NO RACE VARIABLE): >60 ML/MIN/1.73 M^2
GLUCOSE SERPL-MCNC: 78 MG/DL (ref 70–110)
HCT VFR BLD AUTO: 47.5 % (ref 37–48.5)
HGB BLD-MCNC: 16.1 G/DL (ref 12–16)
IMM GRANULOCYTES # BLD AUTO: 0.01 K/UL (ref 0–0.04)
IMM GRANULOCYTES NFR BLD AUTO: 0.1 % (ref 0–0.5)
LYMPHOCYTES # BLD AUTO: 2.7 K/UL (ref 1–4.8)
LYMPHOCYTES NFR BLD: 35.1 % (ref 18–48)
MCH RBC QN AUTO: 30.1 PG (ref 27–31)
MCHC RBC AUTO-ENTMCNC: 33.9 G/DL (ref 32–36)
MCV RBC AUTO: 89 FL (ref 82–98)
MONOCYTES # BLD AUTO: 0.5 K/UL (ref 0.3–1)
MONOCYTES NFR BLD: 5.9 % (ref 4–15)
NEUTROPHILS # BLD AUTO: 4.4 K/UL (ref 1.8–7.7)
NEUTROPHILS NFR BLD: 56.5 % (ref 38–73)
NRBC BLD-RTO: 0 /100 WBC
PLATELET # BLD AUTO: 442 K/UL (ref 150–450)
PMV BLD AUTO: 9.9 FL (ref 9.2–12.9)
POTASSIUM SERPL-SCNC: 4.1 MMOL/L (ref 3.5–5.1)
PROT SERPL-MCNC: 7.7 G/DL (ref 6–8.4)
RBC # BLD AUTO: 5.35 M/UL (ref 4–5.4)
SODIUM SERPL-SCNC: 139 MMOL/L (ref 136–145)
T4 FREE SERPL-MCNC: 1.03 NG/DL (ref 0.71–1.51)
TSH SERPL DL<=0.005 MIU/L-ACNC: 1.44 UIU/ML (ref 0.4–4)
VIT B12 SERPL-MCNC: 556 PG/ML (ref 210–950)
WBC # BLD AUTO: 7.74 K/UL (ref 3.9–12.7)

## 2024-05-30 PROCEDURE — 80053 COMPREHEN METABOLIC PANEL: CPT

## 2024-05-30 PROCEDURE — 85025 COMPLETE CBC W/AUTO DIFF WBC: CPT

## 2024-05-30 PROCEDURE — 82607 VITAMIN B-12: CPT

## 2024-05-30 PROCEDURE — 99999 PR PBB SHADOW E&M-EST. PATIENT-LVL II: CPT | Mod: PBBFAC,,,

## 2024-05-30 PROCEDURE — 84439 ASSAY OF FREE THYROXINE: CPT

## 2024-05-30 PROCEDURE — 82306 VITAMIN D 25 HYDROXY: CPT | Performed by: PSYCHIATRY & NEUROLOGY

## 2024-05-30 PROCEDURE — 80175 DRUG SCREEN QUAN LAMOTRIGINE: CPT

## 2024-05-30 PROCEDURE — 80177 DRUG SCRN QUAN LEVETIRACETAM: CPT

## 2024-05-30 PROCEDURE — 99215 OFFICE O/P EST HI 40 MIN: CPT | Mod: S$GLB,,,

## 2024-05-30 PROCEDURE — 84443 ASSAY THYROID STIM HORMONE: CPT

## 2024-05-30 RX ORDER — LAMOTRIGINE 200 MG/1
200 TABLET ORAL 2 TIMES DAILY
Qty: 180 TABLET | Refills: 3 | Status: SHIPPED | OUTPATIENT
Start: 2024-05-30 | End: 2025-05-25

## 2024-05-30 RX ORDER — LEVETIRACETAM 1000 MG/1
1500 TABLET ORAL 2 TIMES DAILY
Qty: 270 TABLET | Refills: 3 | Status: SHIPPED | OUTPATIENT
Start: 2024-05-30 | End: 2025-05-25

## 2024-05-30 NOTE — PROGRESS NOTES
Name: Diana Trivedi  MRN:[id number]   CSN: [id number]  Date of service: 5/30/2024  Age:24 y.o.   Gender:female   Referring Physician/Service: No referring provider defined for this encounter.   The patient is evaluated today with: [facility]    Neurology Clinic: Follow-up Visit    Interval Events/ROS 5/30/2024:    Current ASM/SEs: lamotrigine 200mg BID and levetiracetam 1500mg BID  Breakthrough seizures/events: none  Driving: yes  Folic acid: yes  Sleep: fine, 8-9hrs    Otherwise, no fever, no cold symptoms, no headache, no changes in vision, no new weakness, no chest pain, no shortness of breath, no nausea, no vomiting, no diarrhea, no constipation, no tingling/numbness, no problems walking.    Recent Labs   Lab 04/28/22  1315   Levetiracetam Lvl 38.3   Lamotrigine Lvl 6.8       Interval Events/ROS 5/4/2023:    Current ASM/SEs: lamotrigine 200mg BID and levetiracetam 1500mg BID; no SE  Breakthrough seizures/events: no known seizures though does mention L arm and hand twitching 5/2/23 that occurred at work ([employer]) for ~30 minutes, maintained awareness, felt like a muscle spasm, sometimes her eyelid does the same thing, no identifiable trigger but possibly poor sleep   Driving: yes  Folic acid: yes  Sleep: intermittent difficulty falling asleep  Mood: overall good     Gradual vision changes, notes new prescription recently from eye doctor. Otherwise, no fever, no cold symptoms, no headache, no new weakness, no chest pain, no shortness of breath, no nausea, no vomiting, no diarrhea, no constipation, no tingling/numbness, no problems walking.     Interval Events/ROS 6/9/2022:     Patient presents today accompanied by dad in follow up for epilepsy. Last seizure was 11/2017. Epilepsy has been well controlled on current regimen of lamotrigine 200mg BID, levetiracetam 1500mg BID, and a VNS. No reported side effects. Taking folic acid daily. Patient has no complaints today other than a rash to back of thighs and ankles  bilaterally. Pruritic. Noticed it two days ago after sitting outside for hours in a mesh chair. Scheduled to see dermatology in two weeks. Working at [employer], would appreciate letter requesting no morning shifts earlier than 7am as patient feels she is not able to get enough sleep if required to get to work earlier than that. Otherwise, no fever, no cold symptoms, no headache, no changes in vision, no new weakness, no chest pain, no shortness of breath, no nausea, no vomiting, no constipation, no tingling/numbness, no problems walking, no mood issues.    Interval Events/ROS 8/11/2021:    Things are going good. 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 Work is going well. No seizures.     Lamotrigine 200 mg twice daily  Levetiracetam 1500 mg twice daily  Folic acid 1 mg daily    No issues with the medications, able to get them without too much expense or trouble. VNS working well. COVID at the end of June. Headaches, right side of brain. Astigmatism. New glasses helped resolve headache. Constipated. Rash evaluated by derm -> eczema.  Much better with creams and shampoo. Otherwise, no fever, no cold symptoms, no new weakness, no chest pain, no shortness of breath, no nausea, no vomiting, no diarrhea, no tingling/numbness, no problems walking, no mood issues.    HPI 8/27/2020:     Age of first seizure: 9yo   Seizure Risk Factors: maternal 2nd cousin once removed and 3rd cousin once removed with seizures as a kid but controlled now, No head strike with LOC, no CNS infection, term (2weeks over due) no prolonged hospitalization    Time of Last Seizure: 2017 EMU stay with 3 seizures in 1 day, ripped toenail off. Head strike.   # of lifetime Seizures:  10 big stiffening episodes, hand rubbing face maybe more than 500  Frequency of Seizures: None in three years, before that every 5 minutes with the rubbing events   Seizure Triggers: missed medication, stress, poor sleep, seizures usually  in the morning.   Injuries/Hospitalization for seizures?  Falls, bloody nose, head strike, ripped off toenail. 3 hospitalizations. 2 EMU stays.   Pregnancy? Maybe one day   Contraception? None, just quit because of weight gain   Folic Acid? Not currently on   Bone Health: no dexa     Auras: stomach will twirl, fingers curl, seconds before the seizure     Seizure Events:   1. Roaming around, fusses, eyes big, hands go up towards chest, whole body stiffening, chewing, makes an ictal grunt, no urinary incontinence, tongue bite   2. Eyes rolling up, will not hear anything, stuttering, left hand rubbing face (stared in pre-K, not since starting keppra)    Current AED/SEs:  1. Keppra 1500mg twice daily SE: none   2. Lamotrigine 200mg BID SE: none     Previous AED/SEs or reason for DC.   1.  Zonisamide SE didn't work     VNS placed 7/2018  Output Current mA 1.5  Signal Freq Hz 20   Pulse width uSec 250  Signal on time Sec 30  Signal off time Min 5.0  Mag Current mA 1.75  Mag on time Sec 60  Pulse width uSec 500  Near EOS No   autostim 1.625  sens 30%   Battery %    EEG:   Eeg 01/2014 (result in media tab)- posterior dominant sharply contoured bursts noted intermittently, 1 to several seconds long epileptiform bursts of polyspike activity maximal over frontal head regions both generalized and more fragmented with shifting maxima, these findings are most consistent with a tendency to primary generalized seizures.  The presence of fragmented shifting bursts raises the question of a lateralize deep frontal focus of secondary generalization.  LTM 3/2014- multiple focal seizures right ant parasaggital also right posterior quadrant  2 Hour EEG 2/16/18- normal  Received long term EMU results off medications from [facility].  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 Migrating headaches. 2HA/per month, do not require treatment. Constipation. Otherwise, denies frequent eye blinking, loss of vision, blurred vision, diplopia, dysarthria, dysphagia, lightheadedness, vertigo, tinnitus or hearing difficulty. Denies difficulties producing or comprehending speech.  Denies focal weakness, numbness, paresthesias. Denies difficulty with gait. Denies cough, shortness of breath.  Denies chest pain or tightness, palpitations.  Denies nausea, vomiting, diarrhea, or abdominal pain.  No bowel or bladder incontinence or retention.  No saddle anesthesia.  No falls.       EXAM:   - Vitals: There were no vitals taken for this visit.   - General: Awake, cooperative, NAD.  - HEENT: NC/AT  - Neck:  Free range of motion  - Pulmonary: no increased WOB  - Cardiac: well perfused   - Abdomen: nondistended  - Extremities:  Not examined  - Skin: no rashes or lesions noted.     NEURO EXAM:   - Mental Status: Awake, alert, oriented x 3. Able to relate history without difficulty. Language is fluent with intact repetition and comprehension. Normal prosody. There were no paraphasic errors. Able to name both high and low frequency objects. Speech was not dysarthric. Able to follow both midline and appendicular commands. There was no evidence of apraxia or neglect.    - Cranial Nerves:  Pupils not evaluated. EOMI. No facial droop. Hearing intact to room voice. Trapezii and SCM with free range of motion. Tongue protrudes in midline and to either side with no evidence of atrophy or weakness.    - Motor: No pronator drift bilaterally. No adventitious movements such as tremor or asterixis noted.  Free range of motion throughout with no obvious asymmetry.     - Sensory:  No subjective deficits  - DTRs: Unable to evaluate  - Coordination:  No obvious ataxia  - Gait:  Able to stand and ambulate around the room    PLAN:  24-year-old woman with epilepsy well controlled with VNS,  levetiracetam 1500 mg twice daily, and lamotrigine 200 mg twice daily. No EEG tracing abnormalities to review however report of both generalized appearing discharges with eyelid myoclonia and focal characteristics (focal discharges and seizures) making definitive characterization unknown.  However, semiology seems most consistent with focal epilepsy (chewing, repetitive hand rubbing). Annual levels/labs. VNS check next visit. Folic acid 1 mg daily. Referral to  for counseling. Advised to reach out over the portal with any concerns. Patient is comfortable with plan. All questions and concerns are addressed at this time. Follow up in about 6 months (around 11/30/2024).     Patient Instructions   You came to Epilepsy Clinic because of your seizure disorder. Please continue the same medications at the same dose.     Do not miss any doses of medication. If a dose of medication is missed, take it as soon as it is remembered even if that means doubling up on the dose. Please get a lab test to check out the blood level of medication. Take folic acid daily.     I placed a referral to our epilepsy  Konstantin who is also a licensed therapist. He will message you over the portal to schedule a virtual visit.     Get regular sleep. Go to sleep at the same time and wake up at the same time every day. People with epilepsy require more sleep than people without epilepsy.  Sleeping 10-12 hours a day can be normal for a person with epilepsy.  Every seizure makes it harder to prevent the next seizure. Epilepsy is associated with SUDEP, or sudden unexpected death in epilepsy.  The risk is significantly higher if convulsive seizures are not well controlled. For more information, check out these websites: https://www.epilepsy.com/, https://www.epilepsyallianceamerica.org/, www.jona-epilepsy.org, www.womenandepilepsy.org.  If you are interested in meeting other individuals in our epilepsy community, please reach out to the  [facility] [place] ([phone], [phone], [email or web address]).  They organize many informative and fun activities in the region.  They can provide you invaluable information on how to get access to resources available for patients living with epilepsy as well as a rich community of like-minded individuals who are all learning to cope with the same issues.  It is very important to remember, you are not alone.     Per [place] law, no episodes of loss of consciousness for 6 months before driving.  Avoid dangerous situations.  For example, no baths/pools alone, no heights, no power tools.  Wear a bike helmet.  If breakthrough seizures occur that are different in character, frequency, or duration from normal episodes, please patient portal me or call the office and we will decide the next steps. If multiple seizures occur in a row without return back to baseline, 911 needs to be called.     Return to clinic in 6-12 months or sooner with issues.  Please patient portal with any questions or concerns.    Elissa Jose PA-C   Neurology-Epilepsy  [facility]     Problem List Items Addressed This Visit          Neuro    Nonintractable epilepsy without status epilepticus - Primary    Relevant Medications    levETIRAcetam (KEPPRA) 1000 MG tablet    lamoTRIgine (LAMICTAL) 200 MG tablet    Other Relevant Orders    Ambulatory referral/consult to Social Work    CBC auto differential (Completed)    Comprehensive metabolic panel (Completed)    TSH (Completed)    T4, FREE (Completed)    Lamotrigine level (Completed)    Levetiracetam level (Completed)    VITAMIN B12 (Completed)    S/P placement of VNS (vagus nerve stimulation) device     Disclaimer: This note has been generated using voice-recognition software. There may be typographical errors that were missed during proof-reading.     LABS:        Recent Labs   Lab 04/28/22  1315   Levetiracetam Lvl 38.3   Lamotrigine Lvl 6.8         IMAGING:  Recent imaging is personally reviewed with the patient.    Eeg 01/2014 (result in media tab)- posterior dominant sharply contoured bursts noted intermittently, 1 to several seconds long epileptiform bursts of polyspike activity maximal over frontal head regions both generalized and more fragmented with shifting maxima, these findings are most consistent with a tendency to primary generalized seizures.  The presence of fragmented shifting bursts raises the question of a lateralize deep frontal focus of secondary generalization.  LTM 3/2014- multiple focal seizures right ant parasaggital also right posterior quadrant  2 Hour EEG 2/16/18- normal  [facility] long term EMU results off medications [facility].  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     MRI 12/27/13 normal      PAST MEDICAL HISTORY:   Active Ambulatory Problems     Diagnosis Date Noted    Nonintractable epilepsy without status epilepticus 07/09/2018    S/P placement of VNS (vagus nerve stimulation) device 08/14/2018    Eczema 08/11/2021     Resolved Ambulatory Problems     Diagnosis Date Noted    Partial epilepsy with impairment of consciousness 03/06/2015    Generalized epilepsy, intractable 05/17/2018     Past Medical History:   Diagnosis Date    Developmental delay     Seizures         PAST SURGICAL HISTORY:   Past Surgical History:   Procedure Laterality Date    VAGUS NERVE STIMULATOR INSERTION N/A 7/9/2018    Procedure: INSERTION, NEUROSTIMULATOR, VAGAL;  Surgeon: Mat Bernardo MD;  Location: [facility] OR [address] [place] [address];  Service: Neurosurgery;  Laterality: N/A;  toronto II, ASA 2, regular bed, supine, [place]        ALLERGIES: Patient has no known allergies.   CURRENT MEDICATIONS:   Current Outpatient Medications   Medication Sig Dispense Refill    folic acid (FOLVITE) 1 MG tablet TAKE 1 TABLET BY MOUTH EVERY DAY 90 tablet 3    lamoTRIgine (LAMICTAL) 200 MG tablet TAKE 1 TABLET BY MOUTH  TWICE A  tablet 3    levETIRAcetam (KEPPRA) 1000 MG tablet Take 1.5 tablets (1,500 mg total) by mouth 2 (two) times daily. 270 tablet 3     Current Facility-Administered Medications   Medication Dose Route Frequency Provider Last Rate Last Admin    levonorgestreL (GORDON) 14 mcg/24 hrs (3 yrs) 13.5 mg IUD 14 mcg  14 mcg Intrauterine  Trupti Harris MD   14 mcg at 09/11/23 1415        FAMILY HISTORY:   Family History   Problem Relation Name Age of Onset    Migraines Mother      Hypertension Mother      Endometriosis Mother      Cancer Maternal Grandmother      Colon cancer Maternal Grandmother      Breast cancer Paternal Grandmother           SOCIAL HISTORY:   Social History     Socioeconomic History    Marital status: Single   Tobacco Use    Smoking status: Never    Smokeless tobacco: Never   Substance and Sexual Activity    Alcohol use: No    Drug use: No    Sexual activity: Not Currently   Social History Narrative    8th grade. Struggles in school     Social Determinants of Health     Financial Resource Strain: Patient Declined (12/20/2023)    Overall Financial Resource Strain (CARDIA)     Difficulty of Paying Living Expenses: Patient declined   Food Insecurity: Patient Declined (12/20/2023)    Hunger Vital Sign     Worried About Running Out of Food in the Last Year: Patient declined     Ran Out of Food in the Last Year: Patient declined   Transportation Needs: Patient Declined (12/20/2023)    PRAPARE - Transportation     Lack of Transportation (Medical): Patient declined     Lack of Transportation (Non-Medical): Patient declined   Physical Activity: Patient Declined (12/20/2023)    Exercise Vital Sign     Days of Exercise per Week: Patient declined     Minutes of Exercise per Session: Patient declined   Stress: Patient Declined (12/20/2023)    [employer] [place] [employer] - Occupational Stress Questionnaire     Feeling of Stress : Patient declined   Housing Stability: Unknown (12/20/2023)     Housing Stability Vital Sign     Unable to Pay for Housing in the Last Year: No     Unstable Housing in the Last Year: No      Questions and concerns raised by the patient and family/care-giver(s) were addressed and they indicated understanding of everything discussed and agreed to plans as above.    Elissa Jose PA-C   Neurology-Epilepsy  [facility]    Collaborating physician, Dr. Sony Colvin, was available during today's encounter.     I spent approximately 44 minutes on the day of this encounter preparing to see the patient, obtaining and reviewing history and results, performing a medically appropriate exam, counseling and educating the patient/family/caregiver, documenting clinical information, coordinating care, and ordering medications, tests, procedures, and referrals.

## 2024-05-30 NOTE — PATIENT INSTRUCTIONS
You came to Epilepsy Clinic because of your seizure disorder. Please continue the same medications at the same dose.     Do not miss any doses of medication. If a dose of medication is missed, take it as soon as it is remembered even if that means doubling up on the dose. Please get a lab test to check out the blood level of medication. Take folic acid daily.     I placed a referral to our epilepsy  Konstantin who is also a licensed therapist. He will message you over the portal to schedule a virtual visit.     Get regular sleep. Go to sleep at the same time and wake up at the same time every day. People with epilepsy require more sleep than people without epilepsy.  Sleeping 10-12 hours a day can be normal for a person with epilepsy.  Every seizure makes it harder to prevent the next seizure. Epilepsy is associated with SUDEP, or sudden unexpected death in epilepsy.  The risk is significantly higher if convulsive seizures are not well controlled. For more information, check out these websites: https://www.epilepsy.com/, https://www.epilepsyallianceamerica.org/, www.jona-epilepsy.org, www.womenandepilepsy.org.  If you are interested in meeting other individuals in our epilepsy community, please reach out to the [facility] [place] ([phone], [phone], [email or web address]@[email or web address]).  They organize many informative and fun activities in the region.  They can provide you invaluable information on how to get access to resources available for patients living with epilepsy as well as a rich community of like-minded individuals who are all learning to cope with the same issues.  It is very important to remember, you are not alone.     Per [place] law, no episodes of loss of consciousness for 6 months before driving.  Avoid dangerous situations.  For example, no baths/pools alone, no heights, no power tools.  Wear a bike helmet.  If breakthrough seizures occur that are different in character,  frequency, or duration from normal episodes, please patient portal me or call the office and we will decide the next steps. If multiple seizures occur in a row without return back to baseline, 911 needs to be called.     Return to clinic in 6-12 months or sooner with issues.  Please patient portal with any questions or concerns.    Elissa Jose PA-C   Neurology-Epilepsy  [facility]

## 2024-06-03 ENCOUNTER — PATIENT MESSAGE (OUTPATIENT)
Dept: NEUROLOGY | Facility: CLINIC | Age: 24
End: 2024-06-03
Payer: COMMERCIAL

## 2024-06-03 LAB
LAMOTRIGINE SERPL-MCNC: 8.8 UG/ML (ref 2–15)
LEVETIRACETAM SERPL-MCNC: 42.4 UG/ML (ref 3–60)

## 2024-06-05 ENCOUNTER — OFFICE VISIT (OUTPATIENT)
Dept: NEUROLOGY | Facility: CLINIC | Age: 24
End: 2024-06-05
Payer: COMMERCIAL

## 2024-06-05 DIAGNOSIS — F32.A DEPRESSION, UNSPECIFIED DEPRESSION TYPE: Primary | ICD-10-CM

## 2024-06-05 DIAGNOSIS — G40.909 NONINTRACTABLE EPILEPSY WITHOUT STATUS EPILEPTICUS, UNSPECIFIED EPILEPSY TYPE: ICD-10-CM

## 2024-06-05 PROCEDURE — 90791 PSYCH DIAGNOSTIC EVALUATION: CPT | Mod: 95,,, | Performed by: SOCIAL WORKER
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
## 2024-06-06 ENCOUNTER — PATIENT MESSAGE (OUTPATIENT)
Dept: NEUROLOGY | Facility: CLINIC | Age: 24
End: 2024-06-06
Payer: COMMERCIAL

## 2024-06-06 PROBLEM — F32.A DEPRESSION: Status: ACTIVE | Noted: 2024-06-06

## 2024-06-19 ENCOUNTER — OFFICE VISIT (OUTPATIENT)
Dept: NEUROLOGY | Facility: CLINIC | Age: 24
End: 2024-06-19
Payer: COMMERCIAL

## 2024-06-19 DIAGNOSIS — F32.A DEPRESSION, UNSPECIFIED DEPRESSION TYPE: ICD-10-CM

## 2024-06-19 DIAGNOSIS — G40.909 NONINTRACTABLE EPILEPSY WITHOUT STATUS EPILEPTICUS, UNSPECIFIED EPILEPSY TYPE: ICD-10-CM

## 2024-06-19 DIAGNOSIS — G40.909 NONINTRACTABLE EPILEPSY WITHOUT STATUS EPILEPTICUS, UNSPECIFIED EPILEPSY TYPE: Primary | ICD-10-CM

## 2024-06-19 PROCEDURE — 90832 PSYTX W PT 30 MINUTES: CPT | Mod: 95,,, | Performed by: SOCIAL WORKER

## 2024-06-19 RX ORDER — FOLIC ACID 1 MG/1
TABLET ORAL
Qty: 90 TABLET | Refills: 3 | Status: SHIPPED | OUTPATIENT
Start: 2024-06-19

## 2024-06-19 NOTE — PROGRESS NOTES
Individual Psychotherapy (PhD/LCSW)    6/19/2024    Site:  Telemed         Location: [place]     Therapeutic Intervention: Met with patient.  Outpatient - Insight oriented psychotherapy 30 min - CPT code 17506, Outpatient - Behavior modifying psychotherapy 30 min - CPT code 84541, and Outpatient - Supportive psychotherapy 30 min - CPT Code 38502    Chief complaint/reason for encounter: depression and anxiety     Interval history and content of current session:   Patient began session by reporting that she has been much calmer in the past few weeks.  Work stressful but she is ok. Instead of workers the customers have been stressful.   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  LCSW expressed empathy and noted that it can be stressful, but it's a good reminder to note that they are not necessarily mad at the patient or even the company.  We can sometimes ask what is making that person so upset and irrational.  We can view this as not even being personal and moving on.  Ofcourse egregious behavior is not allowed and should be reported.     Patient reports she has been applying for jobs online and is still waiting to hear back .  She also did not hear back from her mother's friend.  She is also thinking about a promotion at work and did tell them she is interested.  It would be a little more money.     She reports no events or seizures at this time.     Treatment plan:  Target symptoms: depression, anxiety   Why chosen therapy is appropriate versus another modality: relevant to diagnosis, patient responds to this modality, evidence based practice  Outcome monitoring methods: self-report, observation  Therapeutic intervention type: insight oriented psychotherapy, behavior modifying psychotherapy, supportive psychotherapy    Risk parameters:  Patient reports no suicidal ideation  Patient reports no homicidal ideation  Patient reports no  self-injurious behavior  Patient reports no violent behavior    Verbal deficits: None    Patient's response to intervention:  The patient's response to intervention is accepting, motivated.    Progress toward goals and other mental status changes:  The patient's progress toward goals is good.    Diagnosis:     ICD-10-CM ICD-9-CM   1. Nonintractable epilepsy without status epilepticus, unspecified epilepsy type  G40.909 345.90   2. Depression, unspecified depression type  F32.A 311       Plan:  individual psychotherapy    Return to clinic: 1 month    Length of Service (minutes): 30

## 2024-07-01 ENCOUNTER — PATIENT MESSAGE (OUTPATIENT)
Dept: NEUROLOGY | Facility: CLINIC | Age: 24
End: 2024-07-01
Payer: COMMERCIAL

## 2024-07-17 ENCOUNTER — PATIENT MESSAGE (OUTPATIENT)
Dept: NEUROLOGY | Facility: CLINIC | Age: 24
End: 2024-07-17
Payer: COMMERCIAL

## 2024-12-30 ENCOUNTER — OFFICE VISIT (OUTPATIENT)
Dept: PODIATRY | Facility: CLINIC | Age: 24
End: 2024-12-30
Payer: COMMERCIAL

## 2024-12-30 VITALS — BODY MASS INDEX: 28.81 KG/M2 | HEIGHT: 66 IN | WEIGHT: 179.25 LBS

## 2024-12-30 DIAGNOSIS — B35.1 ONYCHOMYCOSIS DUE TO DERMATOPHYTE: Primary | ICD-10-CM

## 2024-12-30 DIAGNOSIS — S90.211S CONTUSION OF RIGHT GREAT TOE WITH DAMAGE TO NAIL, SEQUELA: ICD-10-CM

## 2024-12-30 PROCEDURE — 1159F MED LIST DOCD IN RCRD: CPT | Mod: CPTII,S$GLB,, | Performed by: PODIATRIST

## 2024-12-30 PROCEDURE — 1160F RVW MEDS BY RX/DR IN RCRD: CPT | Mod: CPTII,S$GLB,, | Performed by: PODIATRIST

## 2024-12-30 PROCEDURE — 99999 PR PBB SHADOW E&M-EST. PATIENT-LVL III: CPT | Mod: PBBFAC,,, | Performed by: PODIATRIST

## 2024-12-30 PROCEDURE — 99204 OFFICE O/P NEW MOD 45 MIN: CPT | Mod: S$GLB,,, | Performed by: PODIATRIST

## 2024-12-30 PROCEDURE — 3008F BODY MASS INDEX DOCD: CPT | Mod: CPTII,S$GLB,, | Performed by: PODIATRIST

## 2024-12-30 RX ORDER — CICLOPIROX 80 MG/ML
SOLUTION TOPICAL NIGHTLY
Qty: 6.6 ML | Refills: 11 | Status: SHIPPED | OUTPATIENT
Start: 2024-12-30

## 2024-12-30 NOTE — PROGRESS NOTES
Subjective:      Patient ID: Diana Trivedi is a 24 y.o. female.    Chief Complaint: Nail Problem and Foot Problem (Feet sweating)    Thick discolored misshapen toenails toes 4 5 right, 1 bilateral.  Gradual onset, worsening over the past year.  Aggravated by no particular condition.  No prior medical treatment.  No self-treatment.  Denies trauma and surgery to these nails both feet.      Right hallux has an acrylic nail placed after severe trauma to the nail greater than a year ago during an epileptic seizure.  She has noted that the acrylic nails now been attached for several months without change in his worried that it might be damaging her or risk for infection or problems with her health and her toe.    Review of Systems   Constitutional: Negative for chills, diaphoresis, fever, malaise/fatigue and night sweats.   Cardiovascular:  Negative for claudication, cyanosis, leg swelling and syncope.   Skin:  Positive for nail changes. Negative for color change, dry skin, rash, suspicious lesions and unusual hair distribution.   Musculoskeletal:  Negative for falls, joint pain, joint swelling, muscle cramps, muscle weakness and stiffness.   Gastrointestinal:  Negative for constipation, diarrhea, nausea and vomiting.   Neurological:  Negative for brief paralysis, disturbances in coordination, focal weakness, numbness, paresthesias, sensory change and tremors.         Objective:      Physical Exam  Constitutional:       General: She is not in acute distress.     Appearance: She is well-developed. She is not diaphoretic.   Cardiovascular:      Pulses:           Popliteal pulses are 2+ on the right side and 2+ on the left side.        Dorsalis pedis pulses are 2+ on the right side and 2+ on the left side.        Posterior tibial pulses are 2+ on the right side and 2+ on the left side.      Comments: Capillary refill 3 seconds all toes/distal feet, all toes/both feet warm to touch.      Negative lymphadenopathy  bilateral popliteal fossa and tarsal tunnel.      Negavie lower extremity edema bilateral.    Musculoskeletal:      Right ankle: No swelling, deformity, ecchymosis or lacerations. Normal range of motion. Normal pulse.      Right Achilles Tendon: Normal. No defects. Baxter's test negative.   Lymphadenopathy:      Lower Body: No right inguinal adenopathy. No left inguinal adenopathy.      Comments: Negative lymphadenopathy bilateral popliteal fossa and tarsal tunnel.    Negative lymphangitic streaking bilateral feet/ankles/legs.   Skin:     General: Skin is warm and dry.      Capillary Refill: Capillary refill takes 2 to 3 seconds.      Coloration: Skin is not pale.      Findings: No abrasion, bruising, burn, ecchymosis, erythema, laceration, lesion or rash.      Nails: There is no clubbing.      Comments: Right hallux toenail is detached from the nailbed proximally 90% but with thick discolored misshapen toenail of the proximal several mm right hallux toenail without open skin drainage pus tracking fluctuance malodor signs of infection.      Otherwise, toenails 1 left, 4 5 right are hypertrophic dystrophic discolored with great crumbly subungual debris beneath but without periungual skin abnormality of each.     Neurological:      Mental Status: She is alert and oriented to person, place, and time.      Sensory: No sensory deficit.      Motor: No tremor, atrophy or abnormal muscle tone.      Gait: Gait normal.      Deep Tendon Reflexes:      Reflex Scores:       Patellar reflexes are 2+ on the right side and 2+ on the left side.       Achilles reflexes are 2+ on the right side and 2+ on the left side.  Psychiatric:         Behavior: Behavior is cooperative.           Assessment:       Encounter Diagnoses   Name Primary?    Onychomycosis due to dermatophyte Yes    Contusion of right great toe with damage to nail, sequela          Plan:       Diana was seen today for nail problem and foot problem.    Diagnoses and  all orders for this visit:    Onychomycosis due to dermatophyte    Contusion of right great toe with damage to nail, sequela    Other orders  -     ciclopirox (PENLAC) 8 % Soln; Apply topically nightly.      I counseled the patient on her conditions, their implications and medical management.        Topical Penlac all affected nails once daily.      Natural fiber socks changed mid day.      Dry well after hygiene.      Discussed conservative treatment with shoes of adequate dimensions, material, and style to alleviate symptoms and delay or prevent surgical intervention.              No follow-ups on file.

## 2025-02-24 ENCOUNTER — PATIENT MESSAGE (OUTPATIENT)
Dept: NEUROLOGY | Facility: CLINIC | Age: 25
End: 2025-02-24
Payer: COMMERCIAL

## 2025-03-20 ENCOUNTER — PATIENT MESSAGE (OUTPATIENT)
Dept: OBSTETRICS AND GYNECOLOGY | Facility: CLINIC | Age: 25
End: 2025-03-20
Payer: COMMERCIAL

## 2025-03-28 ENCOUNTER — OFFICE VISIT (OUTPATIENT)
Dept: OBSTETRICS AND GYNECOLOGY | Facility: CLINIC | Age: 25
End: 2025-03-28
Payer: COMMERCIAL

## 2025-03-28 VITALS — BODY MASS INDEX: 28.9 KG/M2 | WEIGHT: 176.38 LBS | DIASTOLIC BLOOD PRESSURE: 80 MMHG | SYSTOLIC BLOOD PRESSURE: 110 MMHG

## 2025-03-28 DIAGNOSIS — Z30.011 ORAL CONTRACEPTION INITIATION: ICD-10-CM

## 2025-03-28 DIAGNOSIS — Z11.3 SCREENING EXAMINATION FOR STD (SEXUALLY TRANSMITTED DISEASE): Primary | ICD-10-CM

## 2025-03-28 DIAGNOSIS — Z01.419 WELL WOMAN EXAM WITH ROUTINE GYNECOLOGICAL EXAM: ICD-10-CM

## 2025-03-28 PROCEDURE — 99999 PR PBB SHADOW E&M-EST. PATIENT-LVL III: CPT | Mod: PBBFAC,,, | Performed by: OBSTETRICS & GYNECOLOGY

## 2025-03-28 RX ORDER — NORGESTIMATE AND ETHINYL ESTRADIOL 7DAYSX3 28
1 KIT ORAL DAILY
Qty: 28 TABLET | Refills: 11 | Status: SHIPPED | OUTPATIENT
Start: 2025-03-28 | End: 2026-03-28

## 2025-03-28 NOTE — PROGRESS NOTES
SUBJECTIVE:   Diana Trivedi is a 24 y.o. female   for annual well woman exam. Patient's last menstrual period was 2025..    Contraception: not currently sexually active for at least 5 years  IUD removed last year due to weight gain  Cycles are monthly but usually very heavy  Her sister sees me as well and on OCP - tri-sprintec. Would like to try    Past Medical History:   Diagnosis Date    Developmental delay     Seizures      Past Surgical History:   Procedure Laterality Date    VAGUS NERVE STIMULATOR INSERTION N/A 2018    Procedure: INSERTION, NEUROSTIMULATOR, VAGAL;  Surgeon: Mat Bernardo MD;  Location: [facility] OR [address] [place] [address];  Service: Neurosurgery;  Laterality: N/A;  toronto II, ASA 2, regular bed, supine, Che Breanne Randle     Social History[1]  Family History   Problem Relation Name Age of Onset    Migraines Mother      Hypertension Mother      Endometriosis Mother      Cancer Maternal Grandmother      Colon cancer Maternal Grandmother      Breast cancer Paternal Grandmother       OB History    Para Term  AB Living   0 0 0 0 0 0   SAB IAB Ectopic Multiple Live Births   0 0 0 0 0   Obstetric Comments   Gynhx reg   Denies abnl pap         Current Medications[2]  Allergies: Patient has no known allergies.       ROS:  GENERAL: Denies weight gain or weight loss. Feeling well overall.   SKIN: Denies rash or lesions.   HEAD: Denies head injury or headache.   NODES: Denies enlarged lymph nodes.   CHEST: Denies chest pain or shortness of breath.   CARDIOVASCULAR: Denies palpitations or left sided chest pain.   ABDOMEN: No abdominal pain, constipation, diarrhea, nausea, vomiting or rectal bleeding.   URINARY: No frequency, dysuria, hematuria, or burning on urination.  REPRODUCTIVE: Denies vaginal discharge, abnormal vaginal bleeding, lesions, pelvic pain  BREASTS: The patient performs breast self-examination and denies pain, lumps, or nipple discharge.   HEMATOLOGIC: No easy  bruisability or excessive bleeding.  MUSCULOSKELETAL: Denies joint pain or swelling.   NEUROLOGIC: Denies syncope or weakness.   PSYCHIATRIC: Denies depression, anxiety or mood swings.      OBJECTIVE:   /80   Wt 80 kg (176 lb 5.9 oz)   LMP 03/05/2025   BMI 28.90 kg/m²   The patient appears well, alert, oriented x 3, in no distress.  PSYCH:  Normal, full range of affect  NECK: negative, no thyromegaly, trachea midline  SKIN: normal, good color, good turgor and no acne, striae, hirsutism  BREAST EXAM: breasts appear normal, no suspicious masses, no skin or nipple changes or axillary nodes  ABDOMEN: soft, non-tender; bowel sounds normal; no masses,  no organomegaly and no hernias, masses, or hepatosplenomegaly  GENITALIA: normal external genitalia, no erythema, no discharge  BLADDER: soft  URETHRA: normal appearing urethra with no masses, tenderness or lesions and normal urethra, normal urethral meatus  VAGINA: Normal  CERVIX: no lesions or cervical motion tenderness  UTERUS: normal size, contour, position, consistency, mobility, non-tender  ADNEXA: no mass, fullness, tenderness      ASSESSMENT:   Amy was seen today for well woman.    Diagnoses and all orders for this visit:    Well woman exam with routine gynecological exam    Other orders  -     norgestimate-ethinyl estradioL (ORTHO TRI-CYCLEN,TRI-SPRINTEC) 0.18/0.215/0.25 mg-35 mcg (28) tablet; Take 1 tablet by mouth once daily.        1. Health maintenance  -Pap neg 11/2023  -screening for std  -counseled on exercise and healthy diet, weight loss  -bone health:  Discussed Vitamin D and Calcium supplementation, weight bearing exercises  2.  Discussed safety at home/school/work, healthy and balanced diet, sleep hygiene, as well as violence/weapons exposure.   3.   Heavy menstrual bleeding:  start on Tri-sprintec, advised pt to start on the first day of her next cycle         [1]   Social History  Socioeconomic History    Marital status: Single   Tobacco  Use    Smoking status: Never    Smokeless tobacco: Never   Substance and Sexual Activity    Alcohol use: No    Drug use: No    Sexual activity: Not Currently   Social History Narrative    8th grade. Struggles in school     Social Drivers of Health     Financial Resource Strain: Patient Declined (12/20/2023)    Overall Financial Resource Strain (CARDIA)     Difficulty of Paying Living Expenses: Patient declined   Food Insecurity: Patient Declined (12/20/2023)    Hunger Vital Sign     Worried About Running Out of Food in the Last Year: Patient declined     Ran Out of Food in the Last Year: Patient declined   Transportation Needs: Patient Declined (12/20/2023)    PRAPARE - Transportation     Lack of Transportation (Medical): Patient declined     Lack of Transportation (Non-Medical): Patient declined   Physical Activity: Patient Declined (12/20/2023)    Exercise Vital Sign     Days of Exercise per Week: Patient declined     Minutes of Exercise per Session: Patient declined   Stress: Patient Declined (12/20/2023)    [employer] - Occupational Stress Questionnaire     Feeling of Stress : Patient declined   Housing Stability: Unknown (12/20/2023)    Housing Stability Vital Sign     Unable to Pay for Housing in the Last Year: No     Unstable Housing in the Last Year: No   [2]   Current Outpatient Medications   Medication Sig Dispense Refill    ciclopirox (PENLAC) 8 % Soln Apply topically nightly. 6.6 mL 11    folic acid (FOLVITE) 1 MG tablet TAKE 1 TABLET BY MOUTH EVERY DAY 90 tablet 3    lamoTRIgine (LAMICTAL) 200 MG tablet Take 1 tablet (200 mg total) by mouth 2 (two) times daily. 180 tablet 3    levETIRAcetam (KEPPRA) 1000 MG tablet Take 1.5 tablets (1,500 mg total) by mouth 2 (two) times daily. 270 tablet 3     Current Facility-Administered Medications   Medication Dose Route Frequency Provider Last Rate Last Admin    levonorgestreL (GORDON) 14 mcg/24 hrs (3 yrs) 13.5 mg IUD 14 mcg  14 mcg  Intrauterine  Trupti Harris MD   14 mcg at 09/11/23 1260

## 2025-03-31 ENCOUNTER — PATIENT MESSAGE (OUTPATIENT)
Dept: NEUROLOGY | Facility: CLINIC | Age: 25
End: 2025-03-31
Payer: COMMERCIAL

## 2025-03-31 ENCOUNTER — PATIENT MESSAGE (OUTPATIENT)
Dept: OBSTETRICS AND GYNECOLOGY | Facility: CLINIC | Age: 25
End: 2025-03-31
Payer: COMMERCIAL

## 2025-03-31 ENCOUNTER — RESULTS FOLLOW-UP (OUTPATIENT)
Dept: OBSTETRICS AND GYNECOLOGY | Facility: CLINIC | Age: 25
End: 2025-03-31

## 2025-03-31 RX ORDER — FLUCONAZOLE 150 MG/1
150 TABLET ORAL ONCE
Qty: 1 TABLET | Refills: 0 | Status: SHIPPED | OUTPATIENT
Start: 2025-03-31 | End: 2025-03-31

## 2025-04-01 RX ORDER — NORETHINDRONE 0.35 MG/1
1 TABLET ORAL DAILY
Qty: 28 TABLET | Refills: 11 | Status: SHIPPED | OUTPATIENT
Start: 2025-04-01

## 2025-04-27 DIAGNOSIS — G40.909 NONINTRACTABLE EPILEPSY WITHOUT STATUS EPILEPTICUS, UNSPECIFIED EPILEPSY TYPE: ICD-10-CM

## 2025-04-28 RX ORDER — FOLIC ACID 1 MG/1
1000 TABLET ORAL DAILY
Qty: 90 TABLET | Refills: 3 | Status: SHIPPED | OUTPATIENT
Start: 2025-04-28

## 2025-06-02 ENCOUNTER — HOSPITAL ENCOUNTER (EMERGENCY)
Facility: HOSPITAL | Age: 25
Discharge: HOME | End: 2025-06-02
Attending: EMERGENCY MEDICINE
Payer: COMMERCIAL

## 2025-06-02 VITALS
DIASTOLIC BLOOD PRESSURE: 77 MMHG | WEIGHT: 176 LBS | BODY MASS INDEX: 29.32 KG/M2 | OXYGEN SATURATION: 97 % | RESPIRATION RATE: 16 BRPM | TEMPERATURE: 98 F | HEIGHT: 65 IN | SYSTOLIC BLOOD PRESSURE: 133 MMHG | HEART RATE: 90 BPM
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
## 2025-06-02 DIAGNOSIS — S80.01XA CONTUSION OF RIGHT KNEE, INITIAL ENCOUNTER: Primary | ICD-10-CM

## 2025-06-02 LAB
B-HCG UR QL: NEGATIVE
CTP QC/QA: YES

## 2025-06-02 PROCEDURE — 63600175 PHARM REV CODE 636 W HCPCS: Mod: JZ,TB

## 2025-06-02 PROCEDURE — 99284 EMERGENCY DEPT VISIT MOD MDM: CPT | Mod: 25

## 2025-06-02 PROCEDURE — 81025 URINE PREGNANCY TEST: CPT

## 2025-06-02 PROCEDURE — 96372 THER/PROPH/DIAG INJ SC/IM: CPT

## 2025-06-02 RX ORDER — KETOROLAC TROMETHAMINE 30 MG/ML
15 INJECTION, SOLUTION INTRAMUSCULAR; INTRAVENOUS
Status: COMPLETED | OUTPATIENT
Start: 2025-06-02 | End: 2025-06-02

## 2025-06-02 RX ADMIN — KETOROLAC TROMETHAMINE 15 MG: 30 INJECTION, SOLUTION INTRAMUSCULAR at 08:06

## 2025-06-09 ENCOUNTER — TELEPHONE (OUTPATIENT)
Dept: NEUROLOGY | Facility: CLINIC | Age: 25
End: 2025-06-09

## 2025-06-09 ENCOUNTER — OFFICE VISIT (OUTPATIENT)
Dept: NEUROLOGY | Facility: CLINIC | Age: 25
End: 2025-06-09
Payer: COMMERCIAL

## 2025-06-09 DIAGNOSIS — Z96.89 S/P PLACEMENT OF VNS (VAGUS NERVE STIMULATION) DEVICE: ICD-10-CM

## 2025-06-09 DIAGNOSIS — G40.909 NONINTRACTABLE EPILEPSY WITHOUT STATUS EPILEPTICUS, UNSPECIFIED EPILEPSY TYPE: Primary | ICD-10-CM

## 2025-06-09 PROCEDURE — 1159F MED LIST DOCD IN RCRD: CPT | Mod: CPTII,95,,

## 2025-06-09 PROCEDURE — 98005 SYNCH AUDIO-VIDEO EST LOW 20: CPT | Mod: 95,,,

## 2025-06-09 RX ORDER — LEVETIRACETAM 1000 MG/1
1500 TABLET ORAL 2 TIMES DAILY
Qty: 270 TABLET | Refills: 3 | Status: SHIPPED | OUTPATIENT
Start: 2025-06-09 | End: 2026-06-04

## 2025-06-09 RX ORDER — FOLIC ACID 1 MG/1
1000 TABLET ORAL DAILY
Qty: 90 TABLET | Refills: 3 | Status: SHIPPED | OUTPATIENT
Start: 2025-06-09

## 2025-06-09 RX ORDER — LAMOTRIGINE 200 MG/1
200 TABLET ORAL 2 TIMES DAILY
Qty: 180 TABLET | Refills: 3 | Status: SHIPPED | OUTPATIENT
Start: 2025-06-09 | End: 2026-06-04

## 2025-06-09 NOTE — PATIENT INSTRUCTIONS
You came to [facility]psy [facility] because of your seizure disorder. Please continue the same medications at the same dose.     Our epilepsy nurse Apple will call you to schedule a VNS check. When you come for this appointment, please also get a lab test to check out the blood level of medication. I put the orders in. You can go to any [facility] lab at your convenience to get this done.     Do not miss any doses of medication. If a dose of medication is missed, take it as soon as it is remembered even if that means doubling up on the dose. Take folic acid 1mg daily. Get regular sleep. Go to sleep at the same time and wake up at the same time every day. People with epilepsy require more sleep than people without epilepsy.  Sleeping 10-12 hours a day can be normal for a person with epilepsy.  Every seizure makes it harder to prevent the next seizure. Epilepsy is associated with SUDEP, or sudden unexpected death in epilepsy.  The risk is significantly higher if convulsive seizures are not well controlled. For more information, check out these websites: https://www.epilepsy.com/, https://www.epilepsyalliance[place].org/, www.jona-epilepsy.org, www.womenandepilepsy.org.  If you are interested in meeting other individuals in our epilepsy community, please reach out to the [facility] [place] ([phone], [phone], info@[place].org).  They organize many informative and fun activities in the region.  They can provide you invaluable information on how to get access to resources available for patients living with epilepsy as well as a rich community of like-minded individuals who are all learning to cope with the same issues.  It is very important to remember, you are not alone.     Per [place] law, no episodes of loss of consciousness for 6 months before driving.  Avoid dangerous situations.  For example, no baths/pools alone, no heights, no power tools.  Wear a bike helmet.  If breakthrough seizures occur  that are different in character, frequency, or duration from normal episodes, please patient portal me or call the office and we will decide the next steps. If multiple seizures occur in a row without return back to baseline, 911 needs to be called.     Return to clinic in 1 year or sooner with issues.  Please patient portal with any questions or concerns.    Elissa Jose PA-C   Neurology-Epilepsy  [facility]

## 2025-06-09 NOTE — PROGRESS NOTES
Name: Diana Trivedi  MRN:[id number]   CSN: [id number]  Date of service: 6/9/2025  Age:25 y.o.   Gender:female   Referring Physician/Service: No referring provider defined for this encounter.   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    Recent Labs   Lab 05/30/24  1516   Levetiracetam Lvl 42.4   Lamotrigine Lvl 8.8       Interval Events/ROS 5/30/2024:    Current ASM/SEs: lamotrigine 200mg BID and levetiracetam 1500mg BID  Breakthrough seizures/events: none  Driving: yes  Folic acid: yes  Sleep: fine, 8-9hrs    Otherwise, no fever, no cold symptoms, no headache, no changes in vision, no new weakness, no chest pain, no shortness of breath, no nausea, no vomiting, no diarrhea, no constipation, no tingling/numbness, no problems walking.      Interval Events/ROS 5/4/2023:    Current ASM/SEs: lamotrigine 200mg BID and levetiracetam 1500mg BID; no SE  Breakthrough seizures/events: no known seizures though does mention L arm and hand twitching 5/2/23 that occurred at work ([employer]) for ~30 minutes, maintained awareness, felt like a muscle spasm, sometimes her eyelid does the same thing, no identifiable trigger but possibly poor sleep   Driving: yes  Folic acid: yes  Sleep: intermittent difficulty falling asleep  Mood: overall good     Gradual vision changes, notes new prescription recently from eye doctor.  Otherwise, no fever, no cold symptoms, no headache, no new weakness, no chest pain, no shortness of breath, no nausea, no vomiting, no diarrhea, no constipation, no tingling/numbness, no problems walking.     Interval Events/ROS 6/9/2022:     Patient presents today accompanied by dad in follow up for epilepsy. Last seizure was 11/2017. Epilepsy has been well controlled on current regimen of lamotrigine 200mg BID, levetiracetam 1500mg BID, and a VNS. No reported side effects. Taking folic acid daily. Patient has no complaints today other than a rash to back of thighs and ankles bilaterally. Pruritic. Noticed it two days ago after sitting outside for hours in a mesh chair. Scheduled to see dermatology in two weeks. Working at [employer], would appreciate letter requesting no morning shifts earlier than 7am as patient feels she is not able to get enough sleep if required to get to work earlier than that. Otherwise, no fever, no cold symptoms, no headache, no changes in vision, no new weakness, no chest pain, no shortness of breath, no nausea, no vomiting, no constipation, no tingling/numbness, no problems walking, no mood issues.    Interval Events/ROS 8/11/2021:    Things are going good. 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 Work is going well. No seizures.     Lamotrigine 200 mg twice daily  Levetiracetam 1500 mg twice daily  Folic acid 1 mg daily    No issues with the medications, able to get them without too much expense or trouble. VNS working well. COVID at the end of June. Headaches, right side of brain. Astigmatism. New glasses helped resolve headache. Constipated. Rash evaluated by derm -> eczema.  Much better with creams and shampoo. Otherwise, no fever, no cold symptoms, no new weakness, no chest pain, no shortness of breath, no nausea, no vomiting, no diarrhea, no tingling/numbness, no problems walking, no mood issues.    HPI 8/27/2020:     Age of first seizure: 7yo    Seizure Risk Factors: maternal 2nd cousin once removed and 3rd cousin once removed with seizures as a kid but controlled now, No head strike with LOC, no CNS infection, term (2weeks over due) no prolonged hospitalization    Time of Last Seizure: 2017 EMU stay with 3 seizures in 1 day, ripped toenail off. Head strike.   # of lifetime Seizures:  10 big stiffening episodes, hand rubbing face maybe more than 500  Frequency of Seizures: None in three years, before that every 5 minutes with the rubbing events   Seizure Triggers: missed medication, stress, poor sleep, seizures usually in the morning.   Injuries/Hospitalization for seizures?  Falls, bloody nose, head strike, ripped off toenail. 3 hospitalizations. 2 EMU stays.   Pregnancy? Maybe one day   Contraception? None, just quit because of weight gain   Folic Acid? Not currently on   Bone Health: no dexa     Auras: stomach will twirl, fingers curl, seconds before the seizure     Seizure Events:   1. Roaming around, fusses, eyes big, hands go up towards chest, whole body stiffening, chewing, makes an ictal grunt, no urinary incontinence, tongue bite   2. Eyes rolling up, will not hear anything, stuttering, left hand rubbing face (stared in pre-K, not since starting keppra)    Current AED/SEs:  1. Keppra 1500mg twice daily SE: none   2. Lamotrigine 200mg BID SE: none     Previous AED/SEs or reason for DC.   1.  Zonisamide SE didn't work     VNS placed 7/2018  Output Current mA 1.5  Signal Freq Hz 20   Pulse width uSec 250  Signal on time Sec 30  Signal off time Min 5.0  Mag Current mA 1.75  Mag on time Sec 60  Pulse width uSec 500  Near EOS No   autostim 1.625  sens 30%   Battery %    EEG:   Eeg 01/2014 (result in media tab)- posterior dominant sharply contoured bursts noted intermittently, 1 to several seconds long epileptiform bursts of polyspike activity maximal over frontal head regions both generalized and more fragmented with shifting maxima, these  findings are most consistent with a tendency to primary generalized seizures.  The presence of fragmented shifting bursts raises the question of a lateralize deep frontal focus of secondary generalization.  LTM 3/2014- multiple focal seizures right ant parasaggital also right posterior quadrant  2 Hour EEG 2/16/18- normal  Received long term EMU results off medications from [facility].  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 Migrating headaches. 2HA/per month, do not require treatment. Constipation. Otherwise, denies frequent eye blinking, loss of vision, blurred vision, diplopia, dysarthria, dysphagia, lightheadedness, vertigo, tinnitus or hearing difficulty. Denies difficulties producing or comprehending speech.  Denies focal weakness, numbness, paresthesias. Denies difficulty with gait. Denies cough, shortness of breath.  Denies chest pain or tightness, palpitations.  Denies nausea, vomiting, diarrhea, or abdominal pain.  No bowel or bladder incontinence or retention.  No saddle anesthesia.  No falls.       EXAM:   - Vitals: LMP 06/01/2025 (Approximate)    - General: Awake, cooperative, NAD.  - HEENT: NC/AT  - Neck:  Free range of motion  - Pulmonary: no increased WOB  - Cardiac: well perfused   - Abdomen: nondistended  - Extremities:  Not examined  - Skin: no rashes or lesions noted.     NEURO EXAM:   - Mental Status: Awake, alert, oriented x 3. Able to relate history without difficulty. Language is fluent with intact repetition and comprehension. Normal prosody. There were no paraphasic errors. Able to name both high and low frequency objects. Speech was not dysarthric. Able to follow both midline and appendicular commands. There was no evidence of apraxia or neglect.    - Cranial Nerves:  Pupils not evaluated. EOMI. No  facial droop. Hearing intact to room voice. Trapezii and SCM with free range of motion. Tongue protrudes in midline and to either side with no evidence of atrophy or weakness.    - Motor: No pronator drift bilaterally. No adventitious movements such as tremor or asterixis noted.  Free range of motion throughout with no obvious asymmetry.     - Sensory:  No subjective deficits  - DTRs: Unable to evaluate  - Coordination:  No obvious ataxia  - Gait:  Able to stand and ambulate around the room    PLAN:  25-year-old woman with epilepsy well controlled with VNS, levetiracetam 1500 mg twice daily, and lamotrigine 200 mg twice daily. No EEG tracing abnormalities to review however report of both generalized appearing discharges with eyelid myoclonia and focal characteristics (focal discharges and seizures) making definitive characterization unknown.  However, semiology seems most consistent with focal epilepsy (chewing, repetitive hand rubbing). Annual levels/labs. VNS check w/ epilepsy nurse Apple. Folic acid 1 mg daily. Advised to reach out over the portal with any concerns. Patient is comfortable with plan. All questions and concerns are addressed at this time. Follow up in about 1 year (around 6/9/2026).     Patient Instructions   You came to Epilepsy Clinic because of your seizure disorder. Please continue the same medications at the same dose.     Our epilepsy nurse Apple will call you to schedule a VNS check. When you come for this appointment, please also get a lab test to check out the blood level of medication. I put the orders in. You can go to any [facility] lab at your convenience to get this done.     Do not miss any doses of medication. If a dose of medication is missed, take it as soon as it is remembered even if that means doubling up on the dose. Take folic acid 1mg daily. Get regular sleep. Go to sleep at the same time and wake up at the same time every day. People with epilepsy require more sleep than  people without epilepsy.  Sleeping 10-12 hours a day can be normal for a person with epilepsy.  Every seizure makes it harder to prevent the next seizure. Epilepsy is associated with SUDEP, or sudden unexpected death in epilepsy.  The risk is significantly higher if convulsive seizures are not well controlled. For more information, check out these websites: https://www.epilepsy.com/, https://www.epilepsyalliance[place].org/, www.jona-epilepsy.org, www.womenandepilepsy.org.  If you are interested in meeting other individuals in our epilepsy community, please reach out to the [facility] [place] ([phone], [phone], info@[place]ps[place].org).  They organize many informative and fun activities in the region.  They can provide you invaluable information on how to get access to resources available for patients living with epilepsy as well as a rich community of like-minded individuals who are all learning to cope with the same issues.  It is very important to remember, you are not alone.     Per [place] law, no episodes of loss of consciousness for 6 months before driving.  Avoid dangerous situations.  For example, no baths/pools alone, no heights, no power tools.  Wear a bike helmet.  If breakthrough seizures occur that are different in character, frequency, or duration from normal episodes, please patient portal me or call the office and we will decide the next steps. If multiple seizures occur in a row without return back to baseline, 911 needs to be called.     Return to clinic in 1 year or sooner with issues.  Please patient portal with any questions or concerns.    Elissa Jose PA-C   Neurology-Epilepsy  [facility]           Problem List Items Addressed This Visit          Neuro    Nonintractable epilepsy without status epilepticus - Primary    Relevant Medications    lamoTRIgine (LAMICTAL) 200 MG tablet    levETIRAcetam (KEPPRA) 1000 MG tablet    folic acid (FOLVITE) 1  MG tablet    Other Relevant Orders    Lamotrigine level    Levetiracetam Level    CBC auto differential    Comprehensive metabolic panel    S/P placement of VNS (vagus nerve stimulation) device     The patient location is: Home  The chief complaint leading to consultation is: Epilepsy  Visit type: Virtual visit with synchronous audio and video  Total time spent with patient: 8 minutes  Each patient to whom he or she provides medical services by telemedicine is:  (1) informed of the relationship between the physician and patient and the respective role of any other health care provider with respect to management of the patient; and (2) notified that he or she may decline to receive medical services by telemedicine and may withdraw from such care at any time.    Disclaimer: This note has been generated using voice-recognition software. There may be typographical errors that were missed during proof-reading.     LABS:  Recent Labs   Lab 05/30/24  1516   WBC 7.74   Hemoglobin 16.1 H   Hematocrit 47.5   Platelets 442   Sodium 139   Potassium 4.1   BUN 10   Creatinine 0.9   TSH 1.437   Vitamin B-12 556       Recent Labs   Lab 05/30/24  1516   Levetiracetam Lvl 42.4   Lamotrigine Lvl 8.8        IMAGING:  Recent imaging is personally reviewed with the patient.    Eeg 01/2014 (result in media tab)- posterior dominant sharply contoured bursts noted intermittently, 1 to several seconds long epileptiform bursts of polyspike activity maximal over frontal head regions both generalized and more fragmented with shifting maxima, these findings are most consistent with a tendency to primary generalized seizures.  The presence of fragmented shifting bursts raises the question of a lateralize deep frontal focus of secondary generalization.  LTM 3/2014- multiple focal seizures right ant parasaggital also right posterior quadrant  2 Hour EEG 2/16/18- normal  Received long term EMU results off medications from [facility].  Showed multiple  generalized seizures with eyelid myoclonia consitent with Michelle nascimento     MRI 12/27/13 normal      PAST MEDICAL HISTORY:   Active Ambulatory Problems     Diagnosis Date Noted    Nonintractable epilepsy without status epilepticus 07/09/2018    S/P placement of VNS (vagus nerve stimulation) device 08/14/2018    Eczema 08/11/2021    Depression 06/06/2024     Resolved Ambulatory Problems     Diagnosis Date Noted    Partial epilepsy with impairment of consciousness 03/06/2015    Generalized epilepsy, intractable 05/17/2018     Past Medical History:   Diagnosis Date    Developmental delay     Seizures         PAST SURGICAL HISTORY:   Past Surgical History:   Procedure Laterality Date    VAGUS NERVE STIMULATOR INSERTION N/A 7/9/2018    Procedure: INSERTION, NEUROSTIMULATOR, VAGAL;  Surgeon: Mat Bernardo MD;  Location: [facility] OR [address] [place] [address];  Service: Neurosurgery;  Laterality: N/A;  toronto II, ASA 2, regular bed, supine, [place]        ALLERGIES: Patient has no known allergies.   CURRENT MEDICATIONS:   Current Outpatient Medications   Medication Sig Dispense Refill    ciclopirox (PENLAC) 8 % Soln Apply topically nightly. 6.6 mL 11    folic acid (FOLVITE) 1 MG tablet Take 1 tablet (1,000 mcg total) by mouth once daily. 90 tablet 3    norethindrone (MICRONOR) 0.35 mg tablet Take 1 tablet (0.35 mg total) by mouth once daily. 28 tablet 11    norgestimate-ethinyl estradioL (ORTHO TRI-CYCLEN,TRI-SPRINTEC) 0.18/0.215/0.25 mg-35 mcg (28) tablet Take 1 tablet by mouth once daily. 28 tablet 11    lamoTRIgine (LAMICTAL) 200 MG tablet Take 1 tablet (200 mg total) by mouth 2 (two) times daily. 180 tablet 3    levETIRAcetam (KEPPRA) 1000 MG tablet Take 1.5 tablets (1,500 mg total) by mouth 2 (two) times daily. 270 tablet 3     No current facility-administered medications for this visit.        FAMILY HISTORY:   Family History   Problem Relation Name Age of Onset    Migraines Mother      Hypertension Mother      Endometriosis  Mother      Cancer Maternal Grandmother      Colon cancer Maternal Grandmother      Breast cancer Paternal Grandmother           SOCIAL HISTORY:   Social History     Socioeconomic History    Marital status: Single   Tobacco Use    Smoking status: Never    Smokeless tobacco: Never   Substance and Sexual Activity    Alcohol use: No    Drug use: No    Sexual activity: Not Currently   Social History Narrative    8th grade. Struggles in school     Social Drivers of Health     Financial Resource Strain: Patient Declined (12/20/2023)    Overall Financial Resource Strain (CARDIA)     Difficulty of Paying Living Expenses: Patient declined   Food Insecurity: Patient Declined (12/20/2023)    Hunger Vital Sign     Worried About Running Out of Food in the Last Year: Patient declined     Ran Out of Food in the Last Year: Patient declined   Transportation Needs: Patient Declined (12/20/2023)    PRAPARE - Transportation     Lack of Transportation (Medical): Patient declined     Lack of Transportation (Non-Medical): Patient declined   Physical Activity: Patient Declined (12/20/2023)    Exercise Vital Sign     Days of Exercise per Week: Patient declined     Minutes of Exercise per Session: Patient declined   Stress: Patient Declined (12/20/2023)    [employer] - Occupational Stress Questionnaire     Feeling of Stress : Patient declined   Housing Stability: Unknown (12/20/2023)    Housing Stability Vital Sign     Unable to Pay for Housing in the Last Year: No     Unstable Housing in the Last Year: No      Questions and concerns raised by the patient and family/care-giver(s) were addressed and they indicated understanding of everything discussed and agreed to plans as above.    Elissa Jose PA-C   Neurology-Epilepsy  [facility]    Collaborating physician, Dr. Sony Colvin, was available during today's encounter.     I spent approximately 20 minutes on the day of this encounter  preparing to see the patient, obtaining and reviewing history and results, performing a medically appropriate exam, counseling and educating the patient/family/caregiver, documenting clinical information, coordinating care, and ordering medications, tests, procedures, and referrals.

## 2025-06-09 NOTE — TELEPHONE ENCOUNTER
----- Message from Elissa Jose PA-C sent at 6/9/2025 11:18 AM CDT -----  Hello, Could we please get this pt scheduled for a VNS battery check? Thank you!Elda

## 2025-06-12 ENCOUNTER — CLINICAL SUPPORT (OUTPATIENT)
Dept: NEUROLOGY | Facility: CLINIC | Age: 25
End: 2025-06-12
Payer: COMMERCIAL

## 2025-06-12 ENCOUNTER — LAB VISIT (OUTPATIENT)
Dept: LAB | Facility: HOSPITAL | Age: 25
End: 2025-06-12
Payer: COMMERCIAL

## 2025-06-12 DIAGNOSIS — G40.909 NONINTRACTABLE EPILEPSY WITHOUT STATUS EPILEPTICUS, UNSPECIFIED EPILEPSY TYPE: ICD-10-CM

## 2025-06-12 DIAGNOSIS — Z96.89 S/P PLACEMENT OF VNS (VAGUS NERVE STIMULATION) DEVICE: ICD-10-CM

## 2025-06-12 DIAGNOSIS — G40.909 NONINTRACTABLE EPILEPSY WITHOUT STATUS EPILEPTICUS, UNSPECIFIED EPILEPSY TYPE: Primary | ICD-10-CM

## 2025-06-12 DIAGNOSIS — Z96.89 S/P PLACEMENT OF VNS (VAGUS NERVE STIMULATION) DEVICE: Primary | ICD-10-CM

## 2025-06-12 LAB
ABSOLUTE EOSINOPHIL (OHS): 0.07 K/UL
ABSOLUTE MONOCYTE (OHS): 0.4 K/UL (ref 0.3–1)
ABSOLUTE NEUTROPHIL COUNT (OHS): 3.38 K/UL (ref 1.8–7.7)
ALBUMIN SERPL BCP-MCNC: 5.1 G/DL (ref 3.5–5.2)
ALP SERPL-CCNC: 87 UNIT/L (ref 40–150)
ALT SERPL W/O P-5'-P-CCNC: 18 UNIT/L (ref 10–44)
ANION GAP (OHS): 10 MMOL/L (ref 8–16)
AST SERPL-CCNC: 15 UNIT/L (ref 11–45)
BASOPHILS # BLD AUTO: 0.04 K/UL
BASOPHILS NFR BLD AUTO: 0.7 %
BILIRUB SERPL-MCNC: 0.7 MG/DL (ref 0.1–1)
BUN SERPL-MCNC: 14 MG/DL (ref 6–20)
CALCIUM SERPL-MCNC: 9.6 MG/DL (ref 8.7–10.5)
CHLORIDE SERPL-SCNC: 106 MMOL/L (ref 95–110)
CO2 SERPL-SCNC: 26 MMOL/L (ref 23–29)
CREAT SERPL-MCNC: 0.9 MG/DL (ref 0.5–1.4)
ERYTHROCYTE [DISTWIDTH] IN BLOOD BY AUTOMATED COUNT: 11.6 % (ref 11.5–14.5)
GFR SERPLBLD CREATININE-BSD FMLA CKD-EPI: >60 ML/MIN/1.73/M2
GLUCOSE SERPL-MCNC: 89 MG/DL (ref 70–110)
HCT VFR BLD AUTO: 43.4 % (ref 37–48.5)
HGB BLD-MCNC: 14.9 GM/DL (ref 12–16)
IMM GRANULOCYTES # BLD AUTO: 0.01 K/UL (ref 0–0.04)
IMM GRANULOCYTES NFR BLD AUTO: 0.2 % (ref 0–0.5)
LYMPHOCYTES # BLD AUTO: 2.07 K/UL (ref 1–4.8)
MCH RBC QN AUTO: 29.7 PG (ref 27–31)
MCHC RBC AUTO-ENTMCNC: 34.3 G/DL (ref 32–36)
MCV RBC AUTO: 87 FL (ref 82–98)
NUCLEATED RBC (/100WBC) (OHS): 0 /100 WBC
PLATELET # BLD AUTO: 387 K/UL (ref 150–450)
PMV BLD AUTO: 9.8 FL (ref 9.2–12.9)
POTASSIUM SERPL-SCNC: 3.7 MMOL/L (ref 3.5–5.1)
PROT SERPL-MCNC: 7.5 GM/DL (ref 6–8.4)
RBC # BLD AUTO: 5.01 M/UL (ref 4–5.4)
RELATIVE EOSINOPHIL (OHS): 1.2 %
RELATIVE LYMPHOCYTE (OHS): 34.7 % (ref 18–48)
RELATIVE MONOCYTE (OHS): 6.7 % (ref 4–15)
RELATIVE NEUTROPHIL (OHS): 56.5 % (ref 38–73)
SODIUM SERPL-SCNC: 142 MMOL/L (ref 136–145)
WBC # BLD AUTO: 5.97 K/UL (ref 3.9–12.7)

## 2025-06-12 PROCEDURE — 36415 COLL VENOUS BLD VENIPUNCTURE: CPT

## 2025-06-12 PROCEDURE — 85025 COMPLETE CBC W/AUTO DIFF WBC: CPT

## 2025-06-12 PROCEDURE — 80177 DRUG SCRN QUAN LEVETIRACETAM: CPT

## 2025-06-12 PROCEDURE — 84460 ALANINE AMINO (ALT) (SGPT): CPT

## 2025-06-12 PROCEDURE — 80175 DRUG SCREEN QUAN LAMOTRIGINE: CPT

## 2025-06-12 NOTE — PROGRESS NOTES
Arrived with mom to have her VNS battery checked:    Patient ID: RONALD VANCE S/N: [id number]  Implant Date: Jul 9, 2018  Lead Impedance: [id number] Ohms  Battery: 11%- 25%                                NORMAL     AUTOSTIM      MAGNET      Output               1.5 mA         1.625 mA         1.75 mA    Frequency         20 Hz    Pulse Width      250 uSec      500 uSec         500 uSec    On Time           30 sec           30 sec              60 sec    Off Time            5 min    Duty Cycle        10%    Tachycardia     Enabled    Autostim Threshold  30%    Instructed she is due for a generator change and that I would alert MISAEL Zelaya, as well as the neurosurgery team.

## 2025-06-16 LAB
W LAMOTRIGINE: 9.4 UG/ML
W LEVETIRACETAM: 59.7 UG/ML

## 2025-06-17 NOTE — H&P (VIEW-ONLY)
Neurosurgery  History & Physical    SUBJECTIVE:     Chief Complaint: VNS at end of service.     History of Present Illness:  Diana Trivedi is a 25 y.o. female referred by Elda Jose PA-C for VNS pulse generator at end of service. The device was placed in July 2018 by Dr. Bernardo; the patient reports that it has been helpful to reduce her seizure frequency and severity; she reports that she has not had any events since it was implanted.     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 She is joined today on the virtual visit by her mother.     The patient denies any bleeding, infectious, or anesthetic complications with any previous surgery.      Review of patient's allergies indicates:  No Known Allergies    Current Medications[1]    Past Medical History:   Diagnosis Date    Developmental delay     Seizures      Past Surgical History:   Procedure Laterality Date    VAGUS NERVE STIMULATOR INSERTION N/A 7/9/2018    Procedure: INSERTION, NEUROSTIMULATOR, VAGAL;  Surgeon: Mat Bernardo MD;  Location: [facility] OR [address] [place] [address];  Service: Neurosurgery;  Laterality: N/A;  toronto II, ASA 2, regular bed, supine, [facility]     Family History       Problem Relation (Age of Onset)    Breast cancer Paternal Grandmother    Cancer Maternal Grandmother    Colon cancer Maternal Grandmother    Endometriosis Mother    Hypertension Mother    Migraines Mother          Social History     Socioeconomic History    Marital status: Single   Tobacco Use    Smoking status: Never    Smokeless tobacco: Never   Substance and Sexual Activity    Alcohol use: No    Drug use: No    Sexual activity: Not Currently   Social History Narrative    8th grade. Struggles in school     Social Drivers of Health     Financial Resource Strain: Patient Declined (12/20/2023)    Overall Financial Resource Strain (CARDIA)     Difficulty of Paying Living Expenses: Patient declined   Food Insecurity: Patient Declined (12/20/2023)    Hunger Vital Sign      Worried About Running Out of Food in the Last Year: Patient declined     Ran Out of Food in the Last Year: Patient declined   Transportation Needs: Patient Declined (12/20/2023)    PRAPARE - Transportation     Lack of Transportation (Medical): Patient declined     Lack of Transportation (Non-Medical): Patient declined   Physical Activity: Patient Declined (12/20/2023)    Exercise Vital Sign     Days of Exercise per Week: Patient declined     Minutes of Exercise per Session: Patient declined   Stress: Patient Declined (12/20/2023)    [employer] [place] [employer] - Occupational Stress Questionnaire     Feeling of Stress : Patient declined   Housing Stability: Unknown (12/20/2023)    Housing Stability Vital Sign     Unable to Pay for Housing in the Last Year: No     Unstable Housing in the Last Year: No       Review of Systems    OBJECTIVE:     Vital Signs     There is no height or weight on file to calculate BMI.      Physical Exam:    Constitutional: She appears well-developed and well-nourished. No distress.     Eyes: EOM are normal.     Abdominal: Soft.     Skin: Skin displays no rash on extremities. Skin displays no lesions on extremities.   Well healed subclavicular incision     Psych/Behavior: She is alert. She is oriented to person, place, and time.     Musculoskeletal:      Comments: No focal weakness on video visit   Gait normal     Neurological:        Coordination: She has normal finger to nose coordination.   No focal weakness on video visit     Pulmonary: nonlabored respirations     Hematologic: no bruising noted     Diagnostic Results:  Virtual visit, so cannot interrogate device  Per [employer]'s recent interrogation, generator 11-25%     ASSESSMENT/PLAN:     Diana Trivedi is a 25 y.o. female who presents as a referral from Elda Jose for VNS pulse generator at end of service. She is eager to proceed with generator replacement as soon as possible given that she has been free of  seizures since it was implanted. 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     Risks include, but are not limited to, bleeding, pain, infection, scarring, need for further/repeat procedure, death, paralysis, damage to vocal cords, damage to esophagus, damage to trachea, stroke/damage to major blood vessels, Diana's syndrome, gastroparesis, and hardware-related complications. Informed consent was obtained of the patient with her mother present.      A decolonization protocol was given. My team will arrange pre-op labs.      I have encouraged her to contact the clinic in interim with any questions, concerns, or adverse clinical change.    The patient location is: at home   The chief complaint leading to consultation is: VNS pulse generator end of service     Visit type: audiovisual    Face to Face time with patient: 17  33 minutes of total time spent on the encounter, which includes face to face time and non-face to face time preparing to see the patient (eg, review of tests), Obtaining and/or reviewing separately obtained history, Documenting clinical information in the electronic or other health record, Independently interpreting results (not separately reported) and communicating results to the patient/family/caregiver, or Care coordination (not separately reported).         Each patient to whom he or she provides medical services by telemedicine is:  (1) informed of the relationship between the physician and patient and the respective role of any other health care provider with respect to management of the patient; and (2) notified that he or she may decline to receive medical services by telemedicine and may withdraw from such care at any time.    Notes:            [1]   Current Outpatient Medications   Medication Sig Dispense Refill    ciclopirox (PENLAC) 8 % Soln Apply topically nightly. 6.6 mL 11    folic acid  (FOLVITE) 1 MG tablet Take 1 tablet (1,000 mcg total) by mouth once daily. 90 tablet 3    lamoTRIgine (LAMICTAL) 200 MG tablet Take 1 tablet (200 mg total) by mouth 2 (two) times daily. 180 tablet 3    levETIRAcetam (KEPPRA) 1000 MG tablet Take 1.5 tablets (1,500 mg total) by mouth 2 (two) times daily. 270 tablet 3    norethindrone (MICRONOR) 0.35 mg tablet Take 1 tablet (0.35 mg total) by mouth once daily. 28 tablet 11    norgestimate-ethinyl estradioL (ORTHO TRI-CYCLEN,TRI-SPRINTEC) 0.18/0.215/0.25 mg-35 mcg (28) tablet Take 1 tablet by mouth once daily. 28 tablet 11     No current facility-administered medications for this visit.

## 2025-06-17 NOTE — PROGRESS NOTES
Neurosurgery  History & Physical    SUBJECTIVE:     Chief Complaint: VNS at end of service.     History of Present Illness:  Diana Trviedi is a 25 y.o. female referred by Elda Jose PA-C for VNS pulse generator at end of service. The device was placed in July 2018 by Dr. Bernardo; the patient reports that it has been helpful to reduce her seizure frequency and severity; she reports that she has not had any events since it was implanted.     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     The patient denies any bleeding, infectious, or anesthetic complications with any previous surgery.      Review of patient's allergies indicates:  No Known Allergies    Current Medications[1]    Past Medical History:   Diagnosis Date    Developmental delay     Seizures      Past Surgical History:   Procedure Laterality Date    VAGUS NERVE STIMULATOR INSERTION N/A 7/9/2018    Procedure: INSERTION, NEUROSTIMULATOR, VAGAL;  Surgeon: Mat Bernardo MD;  Location: [facility] OR [address] [place] [address];  Service: Neurosurgery;  Laterality: N/A;  toronto II, ASA 2, regular bed, supine, [facility]     Family History       Problem Relation (Age of Onset)    Breast cancer Paternal Grandmother    Cancer Maternal Grandmother    Colon cancer Maternal Grandmother    Endometriosis Mother    Hypertension Mother    Migraines Mother          Social History     Socioeconomic History    Marital status: Single   Tobacco Use    Smoking status: Never    Smokeless tobacco: Never   Substance and Sexual Activity    Alcohol use: No    Drug use: No    Sexual activity: Not Currently   Social History Narrative    8th grade. Struggles in school     Social Drivers of Health     Financial Resource Strain: Patient Declined (12/20/2023)    Overall Financial Resource Strain (CARDIA)     Difficulty of Paying Living Expenses: Patient declined   Food Insecurity: Patient Declined (12/20/2023)    Hunger Vital Sign      Worried About Running Out of Food in the Last Year: Patient declined     Ran Out of Food in the Last Year: Patient declined   Transportation Needs: Patient Declined (12/20/2023)    PRAPARE - Transportation     Lack of Transportation (Medical): Patient declined     Lack of Transportation (Non-Medical): Patient declined   Physical Activity: Patient Declined (12/20/2023)    Exercise Vital Sign     Days of Exercise per Week: Patient declined     Minutes of Exercise per Session: Patient declined   Stress: Patient Declined (12/20/2023)    [employer] [employer] - Occupational Stress Questionnaire     Feeling of Stress : Patient declined   Housing Stability: Unknown (12/20/2023)    Housing Stability Vital Sign     Unable to Pay for Housing in the Last Year: No     Unstable Housing in the Last Year: No       Review of Systems    OBJECTIVE:     Vital Signs     There is no height or weight on file to calculate BMI.      Physical Exam:    Constitutional: She appears well-developed and well-nourished. No distress.     Eyes: EOM are normal.     Abdominal: Soft.     Skin: Skin displays no rash on extremities. Skin displays no lesions on extremities.   Well healed subclavicular incision     Psych/Behavior: She is alert. She is oriented to person, place, and time.     Musculoskeletal:      Comments: No focal weakness on video visit   Gait normal     Neurological:        Coordination: She has normal finger to nose coordination.   No focal weakness on video visit     Pulmonary: nonlabored respirations     Hematologic: no bruising noted     Diagnostic Results:  Virtual visit, so cannot interrogate device  Per [employer]'s recent interrogation, generator 11-25%     ASSESSMENT/PLAN:     Diana Trivedi is a 25 y.o. female who presents as a referral from Elda Jose for VNS pulse generator at end of service. She is eager to proceed with generator replacement as soon as possible given that she has been free of  seizures since it was implanted. 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     Risks include, but are not limited to, bleeding, pain, infection, scarring, need for further/repeat procedure, death, paralysis, damage to vocal cords, damage to esophagus, damage to trachea, stroke/damage to major blood vessels, Diana's syndrome, gastroparesis, and hardware-related complications. Informed consent was obtained of the patient with her mother present.      A decolonization protocol was given. My team will arrange pre-op labs.      I have encouraged her to contact the clinic in interim with any questions, concerns, or adverse clinical change.    The patient location is: at home   The chief complaint leading to consultation is: VNS pulse generator end of service     Visit type: audiovisual    Face to Face time with patient: 17  33 minutes of total time spent on the encounter, which includes face to face time and non-face to face time preparing to see the patient (eg, review of tests), Obtaining and/or reviewing separately obtained history, Documenting clinical information in the electronic or other health record, Independently interpreting results (not separately reported) and communicating results to the patient/family/caregiver, or Care coordination (not separately reported).         Each patient to whom he or she provides medical services by telemedicine is:  (1) informed of the relationship between the physician and patient and the respective role of any other health care provider with respect to management of the patient; and (2) notified that he or she may decline to receive medical services by telemedicine and may withdraw from such care at any time.    Notes:            [1]   Current Outpatient Medications   Medication Sig Dispense Refill    ciclopirox (PENLAC) 8 % Soln Apply topically nightly. 6.6 mL 11    folic acid  (FOLVITE) 1 MG tablet Take 1 tablet (1,000 mcg total) by mouth once daily. 90 tablet 3    lamoTRIgine (LAMICTAL) 200 MG tablet Take 1 tablet (200 mg total) by mouth 2 (two) times daily. 180 tablet 3    levETIRAcetam (KEPPRA) 1000 MG tablet Take 1.5 tablets (1,500 mg total) by mouth 2 (two) times daily. 270 tablet 3    norethindrone (MICRONOR) 0.35 mg tablet Take 1 tablet (0.35 mg total) by mouth once daily. 28 tablet 11    norgestimate-ethinyl estradioL (ORTHO TRI-CYCLEN,TRI-SPRINTEC) 0.18/0.215/0.25 mg-35 mcg (28) tablet Take 1 tablet by mouth once daily. 28 tablet 11     No current facility-administered medications for this visit.

## 2025-06-18 ENCOUNTER — TELEPHONE (OUTPATIENT)
Dept: NEUROSURGERY | Facility: CLINIC | Age: 25
End: 2025-06-18
Payer: COMMERCIAL

## 2025-06-19 ENCOUNTER — OFFICE VISIT (OUTPATIENT)
Dept: NEUROSURGERY | Facility: CLINIC | Age: 25
End: 2025-06-19
Payer: COMMERCIAL

## 2025-06-19 ENCOUNTER — PATIENT MESSAGE (OUTPATIENT)
Dept: NEUROSURGERY | Facility: CLINIC | Age: 25
End: 2025-06-19

## 2025-06-19 ENCOUNTER — TELEPHONE (OUTPATIENT)
Dept: NEUROSURGERY | Facility: CLINIC | Age: 25
End: 2025-06-19

## 2025-06-19 DIAGNOSIS — Z51.81 MONITORING FOR ANTICOAGULANT USE: ICD-10-CM

## 2025-06-19 DIAGNOSIS — G40.919 EPILEPSY WITH ALTERED CONSCIOUSNESS WITH INTRACTABLE EPILEPSY: Primary | ICD-10-CM

## 2025-06-19 DIAGNOSIS — Z79.01 MONITORING FOR ANTICOAGULANT USE: ICD-10-CM

## 2025-06-19 DIAGNOSIS — Z96.89 S/P PLACEMENT OF VNS (VAGUS NERVE STIMULATION) DEVICE: ICD-10-CM

## 2025-06-19 DIAGNOSIS — G40.909 NONINTRACTABLE EPILEPSY WITHOUT STATUS EPILEPTICUS, UNSPECIFIED EPILEPSY TYPE: ICD-10-CM

## 2025-06-19 DIAGNOSIS — Z01.818 PRE-OP EXAM: ICD-10-CM

## 2025-06-19 RX ORDER — MUPIROCIN 20 MG/G
OINTMENT TOPICAL 2 TIMES DAILY
Qty: 1 EACH | Refills: 0 | Status: SHIPPED | OUTPATIENT
Start: 2025-06-19 | End: 2025-06-24

## 2025-06-19 NOTE — PATIENT INSTRUCTIONS
Please use the Bactroban ointment twice daily in your nose for 5 days leading up to surgery. (You may use a Q-tip to apply a little bit of ointment inside each nostril.)    Please use the Hibiclens soap every other day in the shower for the 5 days before your surgery. For example, if surgery is on Monday, use the Hibiclens when you shower on Thursday, Saturday, and Monday morning.     We are asking you to do this to help reduce the chance of having an infection associated with surgery. Thank you!     Please do not take any over-the-counter medications or supplements except Tylenol until surgery.

## 2025-06-20 NOTE — TELEPHONE ENCOUNTER
Date of Procedure: 6.25    Arrival Time: 5:15a    Planned Start Time: 7a    Location: [facility]    Pre-op Instructions Reviewed:    * No food after 9pm night before procedure.   * May have water or electrolyte replacement drinks (e.g. Gatorade/Powerade/Pedialyte) until you leave for the hospital the morning of your procedure.     Medications Reviewed:   Anticoagulants: no anticoagulation  Last dose:     GLP - 1: None   Last Dose:     Parkinson's/Essential Tremor/Dystonia Medications  Not Applicable      Pt verbalized understanding of all instructions. Denies further questions/concerns at this time. Pt encouraged to call the clinic at [phone] or send a message through the portal if they have any other questions/concerns that have not been addressed.     Future Appointments   Date Time Provider Department Center   7/9/2025  9:30 AM Sathya Baker RN [facility] NEUROS8 [place]

## 2025-06-22 ENCOUNTER — PATIENT MESSAGE (OUTPATIENT)
Dept: NEUROSURGERY | Facility: CLINIC | Age: 25
End: 2025-06-22
Payer: COMMERCIAL

## 2025-06-23 ENCOUNTER — LAB VISIT (OUTPATIENT)
Dept: LAB | Facility: HOSPITAL | Age: 25
End: 2025-06-23
Attending: NEUROLOGICAL SURGERY
Payer: COMMERCIAL

## 2025-06-23 DIAGNOSIS — Z51.81 MONITORING FOR ANTICOAGULANT USE: ICD-10-CM

## 2025-06-23 DIAGNOSIS — Z01.818 PRE-OP EXAM: ICD-10-CM

## 2025-06-23 DIAGNOSIS — Z79.01 MONITORING FOR ANTICOAGULANT USE: ICD-10-CM

## 2025-06-23 DIAGNOSIS — G40.919 EPILEPSY WITH ALTERED CONSCIOUSNESS WITH INTRACTABLE EPILEPSY: ICD-10-CM

## 2025-06-23 LAB
APTT PPP: 27.1 SECONDS (ref 21–32)
BILIRUB UR QL STRIP.AUTO: NEGATIVE
CLARITY UR: CLEAR
COLOR UR AUTO: YELLOW
GLUCOSE UR QL STRIP: NEGATIVE
HGB UR QL STRIP: NEGATIVE
HOLD SPECIMEN: NORMAL
INR PPP: 1 (ref 0.8–1.2)
KETONES UR QL STRIP: NEGATIVE
LEUKOCYTE ESTERASE UR QL STRIP: NEGATIVE
NITRITE UR QL STRIP: NEGATIVE
PH UR STRIP: 6 [PH]
PROT UR QL STRIP: ABNORMAL
PROTHROMBIN TIME: 11.4 SECONDS (ref 9–12.5)
SP GR UR STRIP: 1.03
UROBILINOGEN UR STRIP-ACNC: ABNORMAL EU/DL

## 2025-06-23 PROCEDURE — 85730 THROMBOPLASTIN TIME PARTIAL: CPT

## 2025-06-23 PROCEDURE — 36415 COLL VENOUS BLD VENIPUNCTURE: CPT

## 2025-06-23 PROCEDURE — 81003 URINALYSIS AUTO W/O SCOPE: CPT

## 2025-06-23 PROCEDURE — 85610 PROTHROMBIN TIME: CPT

## 2025-06-24 ENCOUNTER — PATIENT MESSAGE (OUTPATIENT)
Dept: NEUROSURGERY | Facility: CLINIC | Age: 25
End: 2025-06-24
Payer: COMMERCIAL

## 2025-06-24 ENCOUNTER — ANESTHESIA EVENT (OUTPATIENT)
Dept: SURGERY | Facility: HOSPITAL | Age: 25
End: 2025-06-24
Payer: COMMERCIAL

## 2025-06-24 NOTE — PRE-PROCEDURE INSTRUCTIONS
Unable to reach patient via phone. Left message with pre procedure instructions and arrival time. The following was sent to pt portal.    Dear Diana ,     I attempted to reach you at [phone] and left a voice message. Can you please confirm you have received these instructions.      You are scheduled for a procedure with Dr. Kidd on 6/25/2025.      Registration check in time: 5:15 AM  Scheduled procedure time: 7:00 AM      Please wear comfortable clothing  This procedure will take place at the [facility] at the corner of [facility] and [facility]. It is in the [place] [employer] [place] next to [employer]. The address is:     [address] [place] [address].  [place] [id number]     After entering the building, proceed to the second floor and check in with registration.      Your fasting instructions are as follows:     Nothing to eat after Midnight the evening before your surgery.   Anesthesia encourages you to stay adequately hydrated. Please STOP drinking clear liquids 2 hours before arrival time.     Examples of clear liquids include:  Water  Coffee (without milk, creamer)  Fruit juices without pulp (apple juice)  Sports drinks (Gatorade)  Sprite or Clear Soda     You MUST have a responsible adult to bring you home.         Please HOLD the following medications the morning of your procedure:  Aspirin and Aspirin-containing products (Goody's powder, Excedrin)  Stimulants (Adderall, Vyvanse, Adipex)  Diabetic medication   Prescription creams/gels/lotions     *You may take tylenol if needed         The evening before and morning of your procedure, take a shower using antibacterial soap (ex: hibiclens or dial antibacterial soap). DO NOT apply deodorant, lotion, cologne, or anything else to the skin. Please do not wear jewelry or bring any valuables with you.  .     Please do not wear contact lenses the day of your procedure.      Bring any inhalers that you may need.     If you have any  procedure-specific questions, please contact your surgeon's office. For pre-admit or anesthesia questions prior to surgery, call KEITH [phone].     FOR QUESTIONS ON THE DAY OF YOUR SURGERY, CALL [phone].     Thanks,  Pre-Admit Testing  Anesthesia Dept [facility]

## 2025-06-25 ENCOUNTER — ANESTHESIA (OUTPATIENT)
Dept: SURGERY | Facility: HOSPITAL | Age: 25
End: 2025-06-25
Payer: COMMERCIAL

## 2025-06-25 ENCOUNTER — HOSPITAL ENCOUNTER (OUTPATIENT)
Facility: HOSPITAL | Age: 25
Discharge: HOME | End: 2025-06-25
Attending: NEUROLOGICAL SURGERY | Admitting: NEUROLOGICAL SURGERY
Payer: COMMERCIAL

## 2025-06-25 VITALS
DIASTOLIC BLOOD PRESSURE: 65 MMHG | WEIGHT: 178 LBS | OXYGEN SATURATION: 99 % | RESPIRATION RATE: 12 BRPM | SYSTOLIC BLOOD PRESSURE: 119 MMHG | HEIGHT: 66 IN | HEART RATE: 78 BPM | BODY MASS INDEX: 28.61 KG/M2 | TEMPERATURE: 97 F

## 2025-06-25 DIAGNOSIS — Z45.42 BATTERY END OF LIFE OF VAGUS NERVE STIMULATOR: ICD-10-CM

## 2025-06-25 LAB
B-HCG UR QL: NEGATIVE
CTP QC/QA: YES

## 2025-06-25 PROCEDURE — 36000707: Performed by: NEUROLOGICAL SURGERY

## 2025-06-25 PROCEDURE — 99900035 HC TECH TIME PER 15 MIN (STAT)

## 2025-06-25 PROCEDURE — 61885 INSRT/REDO NEUROSTIM 1 ARRAY: CPT | Mod: AS,,, | Performed by: STUDENT IN AN ORGANIZED HEALTH CARE EDUCATION/TRAINING PROGRAM

## 2025-06-25 PROCEDURE — 63600175 PHARM REV CODE 636 W HCPCS: Performed by: NURSE ANESTHETIST, CERTIFIED REGISTERED

## 2025-06-25 PROCEDURE — 63600175 PHARM REV CODE 636 W HCPCS: Performed by: NEUROLOGICAL SURGERY

## 2025-06-25 PROCEDURE — C1767 GENERATOR, NEURO NON-RECHARG: HCPCS | Performed by: NEUROLOGICAL SURGERY

## 2025-06-25 PROCEDURE — 71000033 HC RECOVERY, INTIAL HOUR: Performed by: NEUROLOGICAL SURGERY

## 2025-06-25 PROCEDURE — 37000009 HC ANESTHESIA EA ADD 15 MINS: Performed by: NEUROLOGICAL SURGERY

## 2025-06-25 PROCEDURE — 94761 N-INVAS EAR/PLS OXIMETRY MLT: CPT

## 2025-06-25 PROCEDURE — 36000706: Performed by: NEUROLOGICAL SURGERY

## 2025-06-25 PROCEDURE — 61885 INSRT/REDO NEUROSTIM 1 ARRAY: CPT | Mod: ,,, | Performed by: NEUROLOGICAL SURGERY

## 2025-06-25 PROCEDURE — 25000003 PHARM REV CODE 250: Performed by: NURSE ANESTHETIST, CERTIFIED REGISTERED

## 2025-06-25 PROCEDURE — 25000003 PHARM REV CODE 250: Performed by: NEUROLOGICAL SURGERY

## 2025-06-25 PROCEDURE — 37000008 HC ANESTHESIA 1ST 15 MINUTES: Performed by: NEUROLOGICAL SURGERY

## 2025-06-25 PROCEDURE — 71000015 HC POSTOP RECOV 1ST HR: Performed by: NEUROLOGICAL SURGERY

## 2025-06-25 PROCEDURE — 63600175 PHARM REV CODE 636 W HCPCS: Performed by: STUDENT IN AN ORGANIZED HEALTH CARE EDUCATION/TRAINING PROGRAM

## 2025-06-25 PROCEDURE — 81025 URINE PREGNANCY TEST: CPT | Performed by: NEUROLOGICAL SURGERY

## 2025-06-25 PROCEDURE — 25000003 PHARM REV CODE 250: Performed by: STUDENT IN AN ORGANIZED HEALTH CARE EDUCATION/TRAINING PROGRAM

## 2025-06-25 DEVICE — IMPLANTABLE DEVICE: Type: IMPLANTABLE DEVICE | Site: CHEST | Status: FUNCTIONAL

## 2025-06-25 RX ORDER — PROPOFOL 10 MG/ML
VIAL (ML) INTRAVENOUS CONTINUOUS PRN
Status: DISCONTINUED | OUTPATIENT
Start: 2025-06-25 | End: 2025-06-25

## 2025-06-25 RX ORDER — ACETAMINOPHEN 10 MG/ML
INJECTION, SOLUTION INTRAVENOUS
Status: DISCONTINUED | OUTPATIENT
Start: 2025-06-25 | End: 2025-06-25

## 2025-06-25 RX ORDER — CEFAZOLIN 2 G/1
2 INJECTION, POWDER, FOR SOLUTION INTRAMUSCULAR; INTRAVENOUS
Status: COMPLETED | OUTPATIENT
Start: 2025-06-25 | End: 2025-06-25

## 2025-06-25 RX ORDER — SODIUM CHLORIDE 9 MG/ML
INJECTION, SOLUTION INTRAVENOUS CONTINUOUS
Status: DISCONTINUED | OUTPATIENT
Start: 2025-06-25 | End: 2025-06-25 | Stop reason: HOSPADM

## 2025-06-25 RX ORDER — CEPHALEXIN 500 MG/1
500 CAPSULE ORAL EVERY 6 HOURS
Qty: 20 CAPSULE | Refills: 0 | Status: SHIPPED | OUTPATIENT
Start: 2025-06-25 | End: 2025-06-30

## 2025-06-25 RX ORDER — CEFTRIAXONE 1 G/1
INJECTION, POWDER, FOR SOLUTION INTRAMUSCULAR; INTRAVENOUS
Status: DISCONTINUED | OUTPATIENT
Start: 2025-06-25 | End: 2025-06-25 | Stop reason: HOSPADM

## 2025-06-25 RX ORDER — FENTANYL CITRATE 50 UG/ML
25 INJECTION, SOLUTION INTRAMUSCULAR; INTRAVENOUS EVERY 5 MIN PRN
Status: DISCONTINUED | OUTPATIENT
Start: 2025-06-25 | End: 2025-06-25 | Stop reason: HOSPADM

## 2025-06-25 RX ORDER — DEXMEDETOMIDINE HYDROCHLORIDE 100 UG/ML
INJECTION, SOLUTION INTRAVENOUS
Status: DISCONTINUED | OUTPATIENT
Start: 2025-06-25 | End: 2025-06-25

## 2025-06-25 RX ORDER — OXYCODONE AND ACETAMINOPHEN 5; 325 MG/1; MG/1
1 TABLET ORAL
Status: DISCONTINUED | OUTPATIENT
Start: 2025-06-25 | End: 2025-06-25 | Stop reason: HOSPADM

## 2025-06-25 RX ORDER — PROCHLORPERAZINE EDISYLATE 5 MG/ML
5 INJECTION INTRAMUSCULAR; INTRAVENOUS EVERY 30 MIN PRN
Status: DISCONTINUED | OUTPATIENT
Start: 2025-06-25 | End: 2025-06-25 | Stop reason: HOSPADM

## 2025-06-25 RX ORDER — LIDOCAINE HYDROCHLORIDE 20 MG/ML
INJECTION INTRAVENOUS
Status: DISCONTINUED | OUTPATIENT
Start: 2025-06-25 | End: 2025-06-25

## 2025-06-25 RX ORDER — MUPIROCIN 20 MG/G
1 OINTMENT TOPICAL 2 TIMES DAILY
Status: DISCONTINUED | OUTPATIENT
Start: 2025-06-25 | End: 2025-06-25 | Stop reason: HOSPADM

## 2025-06-25 RX ORDER — PROPOFOL 10 MG/ML
VIAL (ML) INTRAVENOUS
Status: DISCONTINUED | OUTPATIENT
Start: 2025-06-25 | End: 2025-06-25

## 2025-06-25 RX ORDER — HYDROMORPHONE HYDROCHLORIDE 1 MG/ML
0.2 INJECTION, SOLUTION INTRAMUSCULAR; INTRAVENOUS; SUBCUTANEOUS EVERY 5 MIN PRN
Status: DISCONTINUED | OUTPATIENT
Start: 2025-06-25 | End: 2025-06-25 | Stop reason: HOSPADM

## 2025-06-25 RX ORDER — GLUCAGON 1 MG
1 KIT INJECTION
Status: DISCONTINUED | OUTPATIENT
Start: 2025-06-25 | End: 2025-06-25 | Stop reason: HOSPADM

## 2025-06-25 RX ORDER — OXYCODONE HYDROCHLORIDE 5 MG/1
5 TABLET ORAL EVERY 6 HOURS PRN
Qty: 20 TABLET | Refills: 0 | Status: SHIPPED | OUTPATIENT
Start: 2025-06-25 | End: 2025-06-30

## 2025-06-25 RX ORDER — FENTANYL CITRATE 50 UG/ML
INJECTION, SOLUTION INTRAMUSCULAR; INTRAVENOUS
Status: DISCONTINUED | OUTPATIENT
Start: 2025-06-25 | End: 2025-06-25

## 2025-06-25 RX ORDER — ONDANSETRON HYDROCHLORIDE 2 MG/ML
4 INJECTION, SOLUTION INTRAVENOUS DAILY PRN
Status: DISCONTINUED | OUTPATIENT
Start: 2025-06-25 | End: 2025-06-25 | Stop reason: HOSPADM

## 2025-06-25 RX ORDER — MIDAZOLAM HYDROCHLORIDE 1 MG/ML
INJECTION INTRAMUSCULAR; INTRAVENOUS
Status: DISCONTINUED | OUTPATIENT
Start: 2025-06-25 | End: 2025-06-25

## 2025-06-25 RX ORDER — ONDANSETRON HYDROCHLORIDE 2 MG/ML
INJECTION, SOLUTION INTRAVENOUS
Status: DISCONTINUED | OUTPATIENT
Start: 2025-06-25 | End: 2025-06-25

## 2025-06-25 RX ORDER — MUPIROCIN 20 MG/G
OINTMENT TOPICAL
Status: DISCONTINUED | OUTPATIENT
Start: 2025-06-25 | End: 2025-06-25 | Stop reason: HOSPADM

## 2025-06-25 RX ORDER — LIDOCAINE HYDROCHLORIDE AND EPINEPHRINE 10; 10 UG/ML; MG/ML
INJECTION, SOLUTION INFILTRATION; PERINEURAL
Status: DISCONTINUED | OUTPATIENT
Start: 2025-06-25 | End: 2025-06-25 | Stop reason: HOSPADM

## 2025-06-25 RX ADMIN — FENTANYL CITRATE 25 MCG: 50 INJECTION, SOLUTION INTRAMUSCULAR; INTRAVENOUS at 07:06

## 2025-06-25 RX ADMIN — MIDAZOLAM HYDROCHLORIDE 2 MG: 1 INJECTION, SOLUTION INTRAMUSCULAR; INTRAVENOUS at 06:06

## 2025-06-25 RX ADMIN — LIDOCAINE HYDROCHLORIDE 100 MG: 20 INJECTION INTRAVENOUS at 07:06

## 2025-06-25 RX ADMIN — GLYCOPYRROLATE 0.2 MG: 0.2 INJECTION, SOLUTION INTRAMUSCULAR; INTRAVENOUS at 06:06

## 2025-06-25 RX ADMIN — SODIUM CHLORIDE: 9 INJECTION, SOLUTION INTRAVENOUS at 05:06

## 2025-06-25 RX ADMIN — PROPOFOL 30 MG: 10 INJECTION, EMULSION INTRAVENOUS at 07:06

## 2025-06-25 RX ADMIN — SODIUM CHLORIDE: 0.9 INJECTION, SOLUTION INTRAVENOUS at 06:06

## 2025-06-25 RX ADMIN — PROPOFOL 150 MCG/KG/MIN: 10 INJECTION, EMULSION INTRAVENOUS at 07:06

## 2025-06-25 RX ADMIN — ACETAMINOPHEN 1000 MG: 10 INJECTION INTRAVENOUS at 07:06

## 2025-06-25 RX ADMIN — MUPIROCIN: 20 OINTMENT TOPICAL at 05:06

## 2025-06-25 RX ADMIN — ONDANSETRON 4 MG: 2 INJECTION INTRAMUSCULAR; INTRAVENOUS at 07:06

## 2025-06-25 RX ADMIN — CEFAZOLIN 2 G: 2 INJECTION, POWDER, FOR SOLUTION INTRAMUSCULAR; INTRAVENOUS at 07:06

## 2025-06-25 RX ADMIN — FENTANYL CITRATE 50 MCG: 50 INJECTION, SOLUTION INTRAMUSCULAR; INTRAVENOUS at 07:06

## 2025-06-25 RX ADMIN — DEXMEDETOMIDINE HYDROCHLORIDE 12 MCG: 100 INJECTION, SOLUTION INTRAVENOUS at 06:06

## 2025-06-25 NOTE — DISCHARGE INSTRUCTIONS
Wound care:    Keep incisions dry. Do not soak under water (bathtub, swimming pool, etc.). Please shower with baby shampoo, but do not take a bath. If the incision becomes wet, gently pat it dry with a clean towel; do not rub.    Remove outer dressing after 48 hours. There are Steri-Strips (butterfly bandages) underneath. Keep clean and dry for 14 days (cover with saran wrap while showering). It is OK if the Steri-Strips fall off on their own before then, but please do not remove them yourself. If they are still on after 14 days, you may gently remove them in the shower. Do not place any ointment over the Steri-Strips.    Other post-op instructions:    No lifting anything heavier than a gallon of milk until cleared in post-operative visit.    No driving until cleared by your epilepsy neurologist.    You have been prescribed a 5 day course of an antibiotic called Keflex. Take the entire course as directed. You now have an implanted device in place. It is imperative that any infection (such as a urinary tract infection) be treated immediately so that it cannot get into your bloodstream. If an infection ends up in your blood, it may infect the device, thus requiring us to remove it.

## 2025-06-25 NOTE — OP NOTE
[facility] ([facility])  Neurosurgery  Operative Note    SUMMARY      Date of Procedure: 6/25/2025     Procedure: Procedure(s) (LRB):  REPLACEMENT, BATTERY, NEUROSTIMULATOR, VAGAL (Left)     Surgeons and Role:     * Diann Kidd MD - Primary    Assisting Surgeon: Elli Gagnon PA-C participated in all aspects of the operation, from positioning to closing     Pre-Operative Diagnosis: Epilepsy with altered consciousness with intractable epilepsy [G40.919]    Post-Operative Diagnosis: Post-Op Diagnosis Codes:     * Epilepsy with altered consciousness with intractable epilepsy [G40.919]    Technical Procedures Used:   1. Replacement of VNS intermittent pulse generator (Model 1000-D SN [id number])   2. Interrogation and programming of VNS system      Indications:   Diana Trivedi is a 25 y.o. female who presents as a referral from Elda Jose for VNS pulse generator at end of service. She is eager to proceed with generator replacement as soon as possible given that she has been free of seizures since it was implanted. 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      Risks include, but are not limited to, bleeding, pain, infection, scarring, need for further/repeat procedure, death, paralysis, damage to vocal cords, damage to esophagus, damage to trachea, stroke/damage to major blood vessels, Diana's syndrome, gastroparesis, and hardware-related complications. Informed consent was obtained of the patient with her mother present.      Description of the Procedure:   The patient was brought back to the operating room, and all pressure points were carefully padded. SCDs were applied, and Ancef was given intravenously. MAC was induced by the anesthesia service. The patient was prepped and draped in the usual sterile fashion.  1% lidocaine with epinephrine was used to infiltrate the skin surface.      Using  the 15 blade, the previously created left subclavicular incision was carefully opened. The generator was brought into view and carefully dissected out. The pocket was copiously irrigated, and meticulous hemostasis was obtained. Using the torque wrench, the prong was dissociated from the top port of the old generator and then reinserted into the fresh generator. The bolt was locked into place with the torque wrench. The same process was repeated with the second prong. The generator was then returned to the pocket and interrogated. The impedence was found to be appropriate at 2875 Ohms.      The wound was then copiously irrigated again. We then turned our attention to obtaining meticulous hemostasis. We used the bipolar at 20 for additional hemostasis as needed. The generator was tacked to the chest with a 2.0 silk. The incision was closed in 2 layers of Vicryl, with 3.0 stitches in the deep layer and inverted 3.0 stitches superficially.  The skin was closed with a running subcuticular 4.0 Monocryl. Mastisol, steri strips, Telfa, and Tegaderm were applied over this. The impedance was still in range at 2833 Ohms, and the device was turned back on at the previous settings.       All counts were correct x2. Antibiotic-containing irrigation was used throughout the case.     The patient was then awakened and brought to the recovery room in satisfactory condition.     Complications: No     Estimated Blood Loss (EBL): minimal           Specimens:   Specimen (24h ago, onward)      None             Implants:   Implant Name Type Inv. Item Serial No.  Lot No. LRB No. Used Action   VNS [employer]   BATTERY   [id number] VideoBurst  Left 1 Implanted              Condition: Stable    Disposition: PACU - hemodynamically stable.    Attestation: I was present and scrubbed for the entire procedure.

## 2025-06-25 NOTE — ANESTHESIA PREPROCEDURE EVALUATION
06/25/2025  Diana Trivedi is a 25 y.o., female.      Pre-op Assessment    I have reviewed the Patient Summary Reports.     I have reviewed the Nursing Notes. I have reviewed the NPO Status.   I have reviewed the Medications.     Review of Systems  Anesthesia Hx:             Denies Family Hx of Anesthesia complications.    Denies Personal Hx of Anesthesia complications.                      Physical Exam  General: Well nourished    Airway:  Mallampati: II   Mouth Opening: Normal  TM Distance: Normal    Chest/Lungs:  Normal Respiratory Rate    Heart:  Rate: Normal    Anesthesia Plan  Type of Anesthesia, risks & benefits discussed:    Anesthesia Type: Gen Natural Airway  Intra-op Monitoring Plan: Standard ASA Monitors  Post Op Pain Control Plan: multimodal analgesia  Induction:  IV  Informed Consent: Informed consent signed with the Patient and all parties understand the risks and agree with anesthesia plan.  All questions answered.   ASA Score: 2  Day of Surgery Review of History & Physical: H&P Update referred to the surgeon/provider.    Ready For Surgery From Anesthesia Perspective.   .

## 2025-06-25 NOTE — BRIEF OP NOTE
[facility]  Brief Operative Note    Surgery Date: 6/25/2025     Surgeons and Role:     * Diann Kidd MD - Primary    Assisting Surgeon: None    Pre-op Diagnosis:  Epilepsy with altered consciousness with intractable epilepsy [G40.919]    Post-op Diagnosis:  Post-Op Diagnosis Codes:     * Epilepsy with altered consciousness with intractable epilepsy [G40.919]    Procedure(s) (LRB):  REPLACEMENT, BATTERY, NEUROSTIMULATOR, VAGAL (Left)    Anesthesia: Local MAC    Operative Findings: See full operative report    Estimated Blood Loss: * No values recorded between 6/25/2025  6:58 AM and 6/25/2025  8:23 AM *         Specimens:   Specimen (24h ago, onward)      None            * No specimens in log *        Discharge Note    OUTCOME: Patient tolerated treatment/procedure well without complication and is now ready for discharge.    DISPOSITION: Home or Self Care    FINAL DIAGNOSIS:  <principal problem not specified>    FOLLOWUP: In clinic    DISCHARGE INSTRUCTIONS:      Wound care:    Keep incisions dry. Do not soak under water (bathtub, swimming pool, etc.). Please shower with baby shampoo, but do not take a bath. If the incision becomes wet, gently pat it dry with a clean towel; do not rub.    Remove outer dressing after 48 hours. There are Steri-Strips (butterfly bandages) underneath. Keep clean and dry for 14 days (cover with saran wrap while showering). It is OK if the Steri-Strips fall off on their own before then, but please do not remove them yourself. If they are still on after 14 days, you may gently remove them in the shower. Do not place any ointment over the Steri-Strips.    Other post-op instructions:    No lifting anything heavier than a gallon of milk until cleared in post-operative visit.    No driving until cleared by your epilepsy neurologist.    You have been prescribed a 5 day course of an antibiotic called Keflex. Take the entire course as directed. You now have an  implanted device in place. It is imperative that any infection (such as a urinary tract infection) be treated immediately so that it cannot get into your bloodstream. If an infection ends up in your blood, it may infect the device, thus requiring us to remove it.

## 2025-06-25 NOTE — PLAN OF CARE
Certification of Assistant at Surgery       Surgery Date: 6/25/2025     Participating Surgeons:  Surgeons and Role:     * Diann Kidd MD - Primary    Procedures:  Procedure(s) (LRB):  REPLACEMENT, BATTERY, NEUROSTIMULATOR, VAGAL (Left)    Assistant Surgeon's Certification of Necessity:  I understand that section 1842 (b) (6) (d) of the Social Security Act generally prohibits Medicare Part B reasonable charge payment for the services of assistants at surgery in teaching hospitals when qualified residents are available to furnish such services. I certify that the services for which payment is claimed were medically necessary, and that no qualified resident was available to perform the services. I further understand that these services are subject to post-payment review by the Medicare carrier.      Elli Gagnon PA-C    06/25/2025  8:25 AM

## 2025-06-25 NOTE — TRANSFER OF CARE
"Anesthesia Transfer of Care Note    Patient: Diana Trivedi    Procedure(s) Performed: Procedure(s) (LRB):  REPLACEMENT, BATTERY, NEUROSTIMULATOR, VAGAL (Left)    Patient location: PACU    Anesthesia Type: general    Transport from OR: Transported from OR on 6-10 L/min O2 by face mask with adequate spontaneous ventilation    Post pain: adequate analgesia    Post assessment: no apparent anesthetic complications and tolerated procedure well    Post vital signs: stable    Level of consciousness: responds to stimulation and sedated    Nausea/Vomiting: no nausea/vomiting    Complications: none    Transfer of care protocol was followed      Last vitals: Visit Vitals  /74 (BP Location: Left arm, Patient Position: Lying)   Pulse 83   Temp 36.7 °C (98.1 °F) (Temporal)   Resp 17   Ht 5' 6" (1.676 m)   Wt 80.7 kg (178 lb)   LMP 06/01/2025 (Approximate)   SpO2 98%   Breastfeeding No   BMI 28.73 kg/m²     "

## 2025-06-25 NOTE — ANESTHESIA POSTPROCEDURE EVALUATION
Anesthesia Post Evaluation    Patient: Diana Trivedi    Procedure(s) Performed: Procedure(s) (LRB):  REPLACEMENT, BATTERY, NEUROSTIMULATOR, VAGAL (Left)    Final Anesthesia Type: general      Patient location during evaluation: PACU  Patient participation: Yes- Able to Participate  Level of consciousness: awake and alert  Post-procedure vital signs: reviewed and stable  Pain management: adequate  Airway patency: patent    PONV status at discharge: No PONV  Anesthetic complications: no      Cardiovascular status: stable  Respiratory status: spontaneous ventilation  Hydration status: euvolemic  Follow-up not needed.          Vitals Value Taken Time   /65 06/25/25 09:02   Temp 36.3 °C (97.3 °F) 06/25/25 08:20   Pulse 99 06/25/25 09:07   Resp 28 06/25/25 09:07   SpO2 100 % 06/25/25 09:07   Vitals shown include unfiled device data.      Event Time   Out of Recovery 08:45:00         Pain/Gil Score: Gil Score: 10 (6/25/2025  9:00 AM)

## 2025-06-25 NOTE — PLAN OF CARE
Pt in preop bay 40, VSS, meds given and IV inserted. Pt denies any open wounds on body or the use of any weight loss injections. Pt needs anesthesia consents signed, H&P, updated H&P, otherwise ready to roll.    Procedural consents verified with pt.

## 2025-07-09 ENCOUNTER — PATIENT MESSAGE (OUTPATIENT)
Dept: NEUROSURGERY | Facility: CLINIC | Age: 25
End: 2025-07-09

## 2025-07-23 DIAGNOSIS — G40.909 NONINTRACTABLE EPILEPSY WITHOUT STATUS EPILEPTICUS, UNSPECIFIED EPILEPSY TYPE: ICD-10-CM

## 2025-07-29 NOTE — TELEPHONE ENCOUNTER
Pt last seen on 6/9/25  Upcoming Appts: N/A    Last ordered  Keppra: ordered on 6/9/25    Confirmed dosage

## 2025-07-30 RX ORDER — LEVETIRACETAM 1000 MG/1
1500 TABLET ORAL 2 TIMES DAILY
Qty: 270 TABLET | Refills: 3 | Status: SHIPPED | OUTPATIENT
Start: 2025-07-30 | End: 2026-07-25

## 2025-08-18 ENCOUNTER — OFFICE VISIT (OUTPATIENT)
Dept: NEUROSURGERY | Facility: CLINIC | Age: 25
End: 2025-08-18
Payer: COMMERCIAL

## 2025-08-18 DIAGNOSIS — Z96.89 S/P PLACEMENT OF VNS (VAGUS NERVE STIMULATION) DEVICE: Primary | ICD-10-CM

## 2025-08-18 PROCEDURE — 99499 UNLISTED E&M SERVICE: CPT | Mod: 95,,, | Performed by: STUDENT IN AN ORGANIZED HEALTH CARE EDUCATION/TRAINING PROGRAM

## (undated) DEVICE — ADHESIVE MASTISOL VIAL 48/BX

## (undated) DEVICE — GAUZE SPONGE PEANUT STRL

## (undated) DEVICE — POSITIONER IV ARMBOARD FOAM

## (undated) DEVICE — STAPLER SKIN PROXIMATE WIDE

## (undated) DEVICE — CLOSURE SKIN STERI STRIP 1/2X4

## (undated) DEVICE — SUT VICRYL PLUS 3-0 SH 18IN

## (undated) DEVICE — KIT SURGIFLO EVITHROM

## (undated) DEVICE — DIFFUSER

## (undated) DEVICE — CARTRIDGE OIL

## (undated) DEVICE — DURAPREP SURG SCRUB 26ML

## (undated) DEVICE — SEE MEDLINE ITEM 157131

## (undated) DEVICE — BLADE ELECTRO EDGE INSULATED

## (undated) DEVICE — TRAY SKIN SCRUB WET PREMIUM

## (undated) DEVICE — CORD BIPOLAR 12 FOOT

## (undated) DEVICE — ELECTRODE REM PLYHSV RETURN 9

## (undated) DEVICE — DRAPE INCISE IOBAN 2 23X17IN

## (undated) DEVICE — REMOVER LOTION

## (undated) DEVICE — DRAPE SURG W/TWL 17 5/8X23

## (undated) DEVICE — UNDERGLOVE BIOGEL PI SZ 6.5 LF

## (undated) DEVICE — COVER PROBE US 5.5X58L NON LTX

## (undated) DEVICE — GOWN POLY REINF BRTH SLV XL

## (undated) DEVICE — Device

## (undated) DEVICE — SEE MEDLINE ITEM 156905

## (undated) DEVICE — DRAPE CAMERA/VIDEO 5 X 96

## (undated) DEVICE — ADHESIVE DERMABOND ADVANCED

## (undated) DEVICE — UNDERGLOVES BIOGEL PI SZ 7 LF

## (undated) DEVICE — DRESSING SURGICAL 1/2X1/2

## (undated) DEVICE — SYS CLSR DERMABOND PRINEO 42CM

## (undated) DEVICE — MARKER SKIN RULER STERILE

## (undated) DEVICE — ELECTRODE BLADE INSULATED 1 IN

## (undated) DEVICE — DRAPE INVISISHIELD TWL 18X24IN

## (undated) DEVICE — TUBE FRAZIER 5MM 2FT SOFT TIP

## (undated) DEVICE — COVER PROBE NL STRL 3.6X96IN

## (undated) DEVICE — DRESSING TELFA N ADH 3X8

## (undated) DEVICE — DRAPE OPMI STERILE

## (undated) DEVICE — NDL HYPO REG 25G X 1 1/2

## (undated) DEVICE — SEE MEDLINE ITEM 157150

## (undated) DEVICE — TRAY FOLEY 16FR INFECTION CONT

## (undated) DEVICE — SUT 4/0 18IN NUROLON BLK B

## (undated) DEVICE — DRAPE THYROID WITH ARMBOARD

## (undated) DEVICE — DRESSING TRANS 4X4 TEGADERM

## (undated) DEVICE — SEE MEDLINE ITEM 153688

## (undated) DEVICE — TUNNELER

## (undated) DEVICE — DRAPE T THYROID STERILE

## (undated) DEVICE — DRAPE STERI INSTRUMENT 1018

## (undated) DEVICE — SUT MCRYL PLUS 4-0 PS2 27IN

## (undated) DEVICE — GLOVE BIOGEL SKINSENSE PI 6.5

## (undated) DEVICE — MARKER SKIN STND TIP BLUE BARR

## (undated) DEVICE — GAUZE SPONGE 4X4 12PLY

## (undated) DEVICE — DRAPE STERI-DRAPE 1000 17X11IN

## (undated) DEVICE — DRESSING TRANS 2X2 TEGADERM